# Patient Record
Sex: FEMALE | Race: WHITE | NOT HISPANIC OR LATINO
[De-identification: names, ages, dates, MRNs, and addresses within clinical notes are randomized per-mention and may not be internally consistent; named-entity substitution may affect disease eponyms.]

---

## 2017-01-10 ENCOUNTER — RECORD ABSTRACTING (OUTPATIENT)
Age: 57
End: 2017-01-10

## 2017-01-10 DIAGNOSIS — H00.19 CHALAZION UNSPECIFIED EYE, UNSPECIFIED EYELID: ICD-10-CM

## 2017-01-10 DIAGNOSIS — F17.200 NICOTINE DEPENDENCE, UNSPECIFIED, UNCOMPLICATED: ICD-10-CM

## 2017-01-10 DIAGNOSIS — H52.4 PRESBYOPIA: ICD-10-CM

## 2017-06-02 ENCOUNTER — OUTPATIENT (OUTPATIENT)
Dept: OUTPATIENT SERVICES | Facility: HOSPITAL | Age: 57
LOS: 1 days | Discharge: HOME | End: 2017-06-02

## 2017-06-28 DIAGNOSIS — H57.13 OCULAR PAIN, BILATERAL: ICD-10-CM

## 2017-06-28 DIAGNOSIS — M25.569 PAIN IN UNSPECIFIED KNEE: ICD-10-CM

## 2017-06-28 DIAGNOSIS — M25.9 JOINT DISORDER, UNSPECIFIED: ICD-10-CM

## 2017-07-06 ENCOUNTER — OUTPATIENT (OUTPATIENT)
Dept: OUTPATIENT SERVICES | Facility: HOSPITAL | Age: 57
LOS: 1 days | Discharge: HOME | End: 2017-07-06

## 2017-07-06 DIAGNOSIS — R52 PAIN, UNSPECIFIED: ICD-10-CM

## 2017-09-13 ENCOUNTER — OUTPATIENT (OUTPATIENT)
Dept: OUTPATIENT SERVICES | Facility: HOSPITAL | Age: 57
LOS: 1 days | Discharge: HOME | End: 2017-09-13

## 2018-03-01 ENCOUNTER — OUTPATIENT (OUTPATIENT)
Dept: OUTPATIENT SERVICES | Facility: HOSPITAL | Age: 58
LOS: 1 days | End: 2018-03-01
Payer: MEDICAID

## 2018-03-01 PROCEDURE — G9001: CPT

## 2018-03-12 ENCOUNTER — OUTPATIENT (OUTPATIENT)
Dept: OUTPATIENT SERVICES | Facility: HOSPITAL | Age: 58
LOS: 1 days | Discharge: HOME | End: 2018-03-12

## 2018-03-12 DIAGNOSIS — Z12.31 ENCOUNTER FOR SCREENING MAMMOGRAM FOR MALIGNANT NEOPLASM OF BREAST: ICD-10-CM

## 2018-03-13 ENCOUNTER — OUTPATIENT (OUTPATIENT)
Dept: OUTPATIENT SERVICES | Facility: HOSPITAL | Age: 58
LOS: 1 days | Discharge: HOME | End: 2018-03-13

## 2018-03-13 DIAGNOSIS — M06.9 RHEUMATOID ARTHRITIS, UNSPECIFIED: ICD-10-CM

## 2018-03-13 DIAGNOSIS — Z13.820 ENCOUNTER FOR SCREENING FOR OSTEOPOROSIS: ICD-10-CM

## 2018-03-13 DIAGNOSIS — Z78.0 ASYMPTOMATIC MENOPAUSAL STATE: ICD-10-CM

## 2018-03-13 DIAGNOSIS — R91.8 OTHER NONSPECIFIC ABNORMAL FINDING OF LUNG FIELD: ICD-10-CM

## 2018-03-13 DIAGNOSIS — R69 ILLNESS, UNSPECIFIED: ICD-10-CM

## 2018-03-13 DIAGNOSIS — F17.200 NICOTINE DEPENDENCE, UNSPECIFIED, UNCOMPLICATED: ICD-10-CM

## 2018-03-13 DIAGNOSIS — M89.9 DISORDER OF BONE, UNSPECIFIED: ICD-10-CM

## 2018-05-10 ENCOUNTER — OUTPATIENT (OUTPATIENT)
Dept: OUTPATIENT SERVICES | Facility: HOSPITAL | Age: 58
LOS: 1 days | Discharge: HOME | End: 2018-05-10

## 2018-05-10 DIAGNOSIS — Z00.00 ENCOUNTER FOR GENERAL ADULT MEDICAL EXAMINATION WITHOUT ABNORMAL FINDINGS: ICD-10-CM

## 2018-05-10 DIAGNOSIS — M47.812 SPONDYLOSIS WITHOUT MYELOPATHY OR RADICULOPATHY, CERVICAL REGION: ICD-10-CM

## 2018-05-10 DIAGNOSIS — E28.2 POLYCYSTIC OVARIAN SYNDROME: ICD-10-CM

## 2018-05-10 DIAGNOSIS — R53.82 CHRONIC FATIGUE, UNSPECIFIED: ICD-10-CM

## 2018-05-10 DIAGNOSIS — N83.209 UNSPECIFIED OVARIAN CYST, UNSPECIFIED SIDE: ICD-10-CM

## 2019-05-24 ENCOUNTER — OUTPATIENT (OUTPATIENT)
Dept: OUTPATIENT SERVICES | Facility: HOSPITAL | Age: 59
LOS: 1 days | Discharge: HOME | End: 2019-05-24
Payer: MEDICAID

## 2019-05-24 DIAGNOSIS — R91.8 OTHER NONSPECIFIC ABNORMAL FINDING OF LUNG FIELD: ICD-10-CM

## 2019-05-24 DIAGNOSIS — M25.562 PAIN IN LEFT KNEE: ICD-10-CM

## 2019-05-24 PROCEDURE — 73562 X-RAY EXAM OF KNEE 3: CPT | Mod: 26,LT

## 2019-05-24 PROCEDURE — 71046 X-RAY EXAM CHEST 2 VIEWS: CPT | Mod: 26

## 2019-06-26 ENCOUNTER — EMERGENCY (EMERGENCY)
Facility: HOSPITAL | Age: 59
LOS: 0 days | Discharge: HOME | End: 2019-06-26
Attending: EMERGENCY MEDICINE | Admitting: EMERGENCY MEDICINE
Payer: MEDICAID

## 2019-06-26 VITALS
SYSTOLIC BLOOD PRESSURE: 143 MMHG | DIASTOLIC BLOOD PRESSURE: 84 MMHG | HEART RATE: 103 BPM | OXYGEN SATURATION: 97 % | RESPIRATION RATE: 20 BRPM | TEMPERATURE: 98 F
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
## 2019-06-26 DIAGNOSIS — S52.501A UNSPECIFIED FRACTURE OF THE LOWER END OF RIGHT RADIUS, INITIAL ENCOUNTER FOR CLOSED FRACTURE: ICD-10-CM
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
## 2019-06-26 DIAGNOSIS — F17.200 NICOTINE DEPENDENCE, UNSPECIFIED, UNCOMPLICATED: ICD-10-CM

## 2019-06-26 DIAGNOSIS — S69.90XA UNSPECIFIED INJURY OF UNSPECIFIED WRIST, HAND AND FINGER(S), INITIAL ENCOUNTER: ICD-10-CM
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
## 2019-06-26 PROCEDURE — 99284 EMERGENCY DEPT VISIT MOD MDM: CPT | Mod: 25

## 2019-06-26 PROCEDURE — 73090 X-RAY EXAM OF FOREARM: CPT | Mod: 26,RT

## 2019-06-26 PROCEDURE — 73110 X-RAY EXAM OF WRIST: CPT | Mod: 26,RT

## 2019-06-26 PROCEDURE — 25605 CLTX DST RDL FX/EPHYS SEP W/: CPT | Mod: 54

## 2019-06-26 PROCEDURE — 73110 X-RAY EXAM OF WRIST: CPT | Mod: 26,RT,77

## 2019-06-26 RX ORDER — OXYCODONE AND ACETAMINOPHEN 5; 325 MG/1; MG/1
1 TABLET ORAL ONCE
Refills: 0 | Status: DISCONTINUED | OUTPATIENT
Start: 2019-06-26 | End: 2019-06-26

## 2019-06-26 RX ADMIN — OXYCODONE AND ACETAMINOPHEN 1 TABLET(S): 5; 325 TABLET ORAL at 16:11

## 2019-06-26 RX ADMIN — OXYCODONE AND ACETAMINOPHEN 1 TABLET(S): 5; 325 TABLET ORAL at 14:58
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
## 2019-06-26 NOTE — ED PROVIDER NOTE - CLINICAL SUMMARY MEDICAL DECISION MAKING FREE TEXT BOX
pt seen for wrist fracture in ED, s/p reduction and splinting with repeat imaging demonstrating better alignment. pt advised to follow up closely with orthopedist and referral given; pt agreed to f/u. Strict return precautions advised and pt verbalized understanding.
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
## 2019-06-26 NOTE — ED PROVIDER NOTE - NS ED ROS FT
Review of Systems:  	•	CONSTITUTIONAL - no fever, no diaphoresis, no chills  	•	SKIN - no rash  	•	HEMATOLOGIC - no bleeding, no bruising  	•	EYES - no eye pain, no blurry vision  	•	ENT - no congestion  	•	RESPIRATORY - no shortness of breath, no cough  	•	CARDIAC - no chest pain, no palpitations  	•	GI - no abd pain, no nausea, no vomiting, no diarrhea, no constipation  	•	GENITO-URINARY - no dysuria; no hematuria, no increased urinary frequency  	•	MUSCULOSKELETAL - +wrist pain, + swelling, no redness  	•	NEUROLOGIC - no weakness, no headache, no paresthesias, no LOC  	•	PSYCH - no anxiety,  no depression  	All other ROS are negative except as documented in HPI.

## 2019-06-26 NOTE — ED ADULT NURSE NOTE - NSIMPLEMENTINTERV_GEN_ALL_ED
Implemented All Universal Safety Interventions:  [place] to call system. Call bell, personal items and telephone within reach. Instruct patient to call for assistance. Room bathroom lighting operational. Non-slip footwear when patient is off stretcher. Physically safe environment: no spills, clutter or unnecessary equipment. Stretcher in lowest position, wheels locked, appropriate side rails in place.
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
## 2019-06-26 NOTE — ED PROVIDER NOTE - CARE PROVIDER_API CALL
Neymar Estrada)  Orthopaedic Surgery  [address] [place] [address]  Phone: [phone]  Fax: [phone]  Follow Up Time: 1-3 Days

## 2019-06-26 NOTE — ED PROVIDER NOTE - NSFOLLOWUPINSTRUCTIONS_ED_ALL_ED_FT
Wrist Fracture in Adults    WHAT YOU NEED TO KNOW:    What is a wrist fracture? A wrist fracture is a break in one or more of the bones in your wrist. A wrist fracture may be caused by a fall, car accident, or sports injury. In older adults, a wrist fracture may be caused by weak bones. Adult Arm Bones         What are the signs and symptoms of a wrist fracture?     Pain, swelling, and bruising of your injured wrist      Wrist pain that is worse when you hold something or put pressure on your wrist      Weakness, numbness, or tingling in your injured hand or wrist      Trouble moving your wrist, hand, or fingers      A change in the shape of your wrist     How is a wrist fracture diagnosed? Your healthcare provider will examine you. You may need an x-ray, CT scan, or MRI. You may be given contrast liquid to help your wrist bones show up better in pictures. Tell the healthcare provider if you have ever had an allergic reaction to contrast liquid. Do not enter the MRI room with anything metal. Metal can cause serious injury. Tell the healthcare provider if you have any metal in or on your body.     How is a wrist fracture treated? Treatment will depend on which wrist bone was broken and the kind of fracture you have. You may need any of the following:     Medicine may be given to decrease pain and swelling. You may need antibiotic medicine or a tetanus shot if there is a break in your skin.       A cast, splint, or brace may be placed on your wrist to decrease movement. These devices will help hold the bones in place while they heal.       Traction may be needed if your bone broke into 2 pieces. Traction pulls on the bone pieces to pull them back into place. A pin may be put in your bone or cast and hooked to ropes and a pulley. Weight is hung on the rope to help pull on the bones so they will heal correctly.      A closed reduction is a procedure to put your bones into the correct position without surgery.       Surgery may be needed to put your bones back into the correct position. Wires, pins, plates or screws may be used to help hold the bones in place.     How can I manage my symptoms?     Rest as much as possible. Do not play contact sports until the healthcare provider says it is okay.       Apply ice on your wrist for 15 to 20 minutes every hour or as directed. Use an ice pack, or put crushed ice in a plastic bag. Cover it with a towel before you place it on your skin. Ice helps prevent tissue damage and decreases swelling and pain.      Elevate your wrist above the level of your heart as often as possible. This will help decrease swelling and pain. Prop your wrist on pillows or blankets to keep it elevated comfortably.       Go to physical therapy as directed. You may need physical therapy after your wrist heals and the cast is removed. A physical therapist can teach you exercises to help improve movement and strength and to decrease pain.     When should I seek immediate care?     Your pain gets worse or does not get better after you take pain medicine.      Your cast or splint breaks, gets wet, or is damaged.      Your hand or fingers feel numb or cold.       Your hand or fingers turn white or blue.       Your splint or cast feels too tight.       You have more pain or swelling after the cast or splint is put on.    When should I contact my healthcare provider?     You have a fever.       There is a foul smell or blood coming from under the cast.      You have questions or concerns about your condition or care.    CARE AGREEMENT:    You have the right to help plan your care. Learn about your health condition and how it may be treated. Discuss treatment options with your healthcare providers to decide what care you want to receive. You always have the right to refuse treatment.

## 2019-06-26 NOTE — ED PROVIDER NOTE - PHYSICAL EXAMINATION
VITAL SIGNS: I have reviewed nursing notes and confirm.  CONSTITUTIONAL: Well-developed; well-nourished; in no acute distress.  SKIN: Skin exam is warm and dry, no acute rash.  HEAD: Normocephalic; atraumatic.  EYES: PERRL, EOM intact; conjunctiva and sclera clear.  ENT: No nasal discharge; airway clear.   NECK: Supple; non tender.  CARD: S1, S2 normal; no murmurs, gallops, or rubs. Regular rate and rhythm.  RESP: Clear to auscultation bilaterally. No wheezes, rales or rhonchi.  ABD: Normal bowel sounds; soft; non-distended; non-tender.   EXT: +Limited and painful ROM of right wrist with swelling and deformity.   LYMPH: No acute cervical adenopathy.  NEURO: Alert, oriented. Grossly unremarkable. No focal deficits. Motor and sensation intact. Neurovascularly intact.   PSYCH: Cooperative, appropriate.

## 2019-06-26 NOTE — ED PROVIDER NOTE - NSFOLLOWUPCLINICS_GEN_ALL_ED_FT
[facility]  Orthpedic  [address], [place]   Phone: [phone]  Fax:   Follow Up Time: 1-3 Days

## 2019-06-28 NOTE — ED PROCEDURE NOTE - CPROC ED POST PROC CARE GUIDE1
Instructed patient/caregiver to follow-up with primary care physician./Instructed patient/caregiver regarding signs and symptoms of infection./Elevate the injured extremity as instructed./Verbal/written post procedure instructions were given to patient/caregiver./Keep the cast/splint/dressing clean and dry.
Elevate the injured extremity as instructed./Verbal/written post procedure instructions were given to patient/caregiver./Instructed patient/caregiver to follow-up with primary care physician./Keep the cast/splint/dressing clean and dry.

## 2019-07-10 ENCOUNTER — OUTPATIENT (OUTPATIENT)
Dept: OUTPATIENT SERVICES | Facility: HOSPITAL | Age: 59
LOS: 1 days | Discharge: HOME | End: 2019-07-10
Payer: MEDICAID

## 2019-07-10 VITALS
DIASTOLIC BLOOD PRESSURE: 62 MMHG | TEMPERATURE: 96 F | HEART RATE: 57 BPM | OXYGEN SATURATION: 96 % | RESPIRATION RATE: 18 BRPM | SYSTOLIC BLOOD PRESSURE: 122 MMHG | HEIGHT: 63 IN | WEIGHT: 116.84 LBS

## 2019-07-10 DIAGNOSIS — S52.551A OTHER EXTRAARTICULAR FRACTURE OF LOWER END OF RIGHT RADIUS, INITIAL ENCOUNTER FOR CLOSED FRACTURE: ICD-10-CM

## 2019-07-10 DIAGNOSIS — Z01.818 ENCOUNTER FOR OTHER PREPROCEDURAL EXAMINATION: ICD-10-CM

## 2019-07-10 DIAGNOSIS — Z98.890 OTHER SPECIFIED POSTPROCEDURAL STATES: Chronic | ICD-10-CM

## 2019-07-10 LAB
ALBUMIN SERPL ELPH-MCNC: 4.2 G/DL — SIGNIFICANT CHANGE UP (ref 3.5–5.2)
ALP SERPL-CCNC: 71 U/L — SIGNIFICANT CHANGE UP (ref 30–115)
ALT FLD-CCNC: 30 U/L — SIGNIFICANT CHANGE UP (ref 0–41)
ANION GAP SERPL CALC-SCNC: 12 MMOL/L — SIGNIFICANT CHANGE UP (ref 7–14)
APPEARANCE UR: CLEAR — SIGNIFICANT CHANGE UP
APTT BLD: 31.7 SEC — SIGNIFICANT CHANGE UP (ref 27–39.2)
AST SERPL-CCNC: 39 U/L — SIGNIFICANT CHANGE UP (ref 0–41)
BACTERIA # UR AUTO: NEGATIVE — SIGNIFICANT CHANGE UP
BASOPHILS # BLD AUTO: 0.07 K/UL — SIGNIFICANT CHANGE UP (ref 0–0.2)
BASOPHILS NFR BLD AUTO: 1 % — SIGNIFICANT CHANGE UP (ref 0–1)
BILIRUB SERPL-MCNC: <0.2 MG/DL — SIGNIFICANT CHANGE UP (ref 0.2–1.2)
BILIRUB UR-MCNC: NEGATIVE — SIGNIFICANT CHANGE UP
BUN SERPL-MCNC: 9 MG/DL — LOW (ref 10–20)
CALCIUM SERPL-MCNC: 9.1 MG/DL — SIGNIFICANT CHANGE UP (ref 8.5–10.1)
CHLORIDE SERPL-SCNC: 98 MMOL/L — SIGNIFICANT CHANGE UP (ref 98–110)
CO2 SERPL-SCNC: 29 MMOL/L — SIGNIFICANT CHANGE UP (ref 17–32)
COLOR SPEC: SIGNIFICANT CHANGE UP
CREAT SERPL-MCNC: 0.7 MG/DL — SIGNIFICANT CHANGE UP (ref 0.7–1.5)
DIFF PNL FLD: NEGATIVE — SIGNIFICANT CHANGE UP
EOSINOPHIL # BLD AUTO: 0.36 K/UL — SIGNIFICANT CHANGE UP (ref 0–0.7)
EOSINOPHIL NFR BLD AUTO: 5.3 % — SIGNIFICANT CHANGE UP (ref 0–8)
EPI CELLS # UR: 9 /HPF — HIGH (ref 0–5)
GLUCOSE SERPL-MCNC: 104 MG/DL — HIGH (ref 70–99)
GLUCOSE UR QL: NEGATIVE — SIGNIFICANT CHANGE UP
HCT VFR BLD CALC: 35.5 % — LOW (ref 37–47)
HGB BLD-MCNC: 11.8 G/DL — LOW (ref 12–16)
HYALINE CASTS # UR AUTO: 4 /LPF — SIGNIFICANT CHANGE UP (ref 0–7)
IMM GRANULOCYTES NFR BLD AUTO: 0.4 % — HIGH (ref 0.1–0.3)
INR BLD: 0.98 RATIO — SIGNIFICANT CHANGE UP (ref 0.65–1.3)
KETONES UR-MCNC: NEGATIVE — SIGNIFICANT CHANGE UP
LEUKOCYTE ESTERASE UR-ACNC: ABNORMAL
LYMPHOCYTES # BLD AUTO: 2.38 K/UL — SIGNIFICANT CHANGE UP (ref 1.2–3.4)
LYMPHOCYTES # BLD AUTO: 34.7 % — SIGNIFICANT CHANGE UP (ref 20.5–51.1)
MCHC RBC-ENTMCNC: 32.3 PG — HIGH (ref 27–31)
MCHC RBC-ENTMCNC: 33.2 G/DL — SIGNIFICANT CHANGE UP (ref 32–37)
MCV RBC AUTO: 97.3 FL — SIGNIFICANT CHANGE UP (ref 81–99)
MONOCYTES # BLD AUTO: 0.73 K/UL — HIGH (ref 0.1–0.6)
MONOCYTES NFR BLD AUTO: 10.7 % — HIGH (ref 1.7–9.3)
NEUTROPHILS # BLD AUTO: 3.28 K/UL — SIGNIFICANT CHANGE UP (ref 1.4–6.5)
NEUTROPHILS NFR BLD AUTO: 47.9 % — SIGNIFICANT CHANGE UP (ref 42.2–75.2)
NITRITE UR-MCNC: NEGATIVE — SIGNIFICANT CHANGE UP
NRBC # BLD: 0 /100 WBCS — SIGNIFICANT CHANGE UP (ref 0–0)
PH UR: 6 — SIGNIFICANT CHANGE UP (ref 5–8)
PLATELET # BLD AUTO: 338 K/UL — SIGNIFICANT CHANGE UP (ref 130–400)
POTASSIUM SERPL-MCNC: 4.1 MMOL/L — SIGNIFICANT CHANGE UP (ref 3.5–5)
POTASSIUM SERPL-SCNC: 4.1 MMOL/L — SIGNIFICANT CHANGE UP (ref 3.5–5)
PROT SERPL-MCNC: 6.5 G/DL — SIGNIFICANT CHANGE UP (ref 6–8)
PROT UR-MCNC: NEGATIVE — SIGNIFICANT CHANGE UP
PROTHROM AB SERPL-ACNC: 11.3 SEC — SIGNIFICANT CHANGE UP (ref 9.95–12.87)
RBC # BLD: 3.65 M/UL — LOW (ref 4.2–5.4)
RBC # FLD: 14 % — SIGNIFICANT CHANGE UP (ref 11.5–14.5)
RBC CASTS # UR COMP ASSIST: 1 /HPF — SIGNIFICANT CHANGE UP (ref 0–4)
SODIUM SERPL-SCNC: 139 MMOL/L — SIGNIFICANT CHANGE UP (ref 135–146)
SP GR SPEC: 1.01 — SIGNIFICANT CHANGE UP (ref 1.01–1.02)
UROBILINOGEN FLD QL: SIGNIFICANT CHANGE UP
WBC # BLD: 6.85 K/UL — SIGNIFICANT CHANGE UP (ref 4.8–10.8)
WBC # FLD AUTO: 6.85 K/UL — SIGNIFICANT CHANGE UP (ref 4.8–10.8)
WBC UR QL: 13 /HPF — HIGH (ref 0–5)

## 2019-07-10 PROCEDURE — 93010 ELECTROCARDIOGRAM REPORT: CPT

## 2019-07-10 NOTE — H&P PST ADULT - NSICDXPASTMEDICALHX_GEN_ALL_CORE_FT
PAST MEDICAL HISTORY:  COPD (chronic obstructive pulmonary disease)     Fibromyalgia     H/O aneurysm brain    Hypercholesteremia     SOB (shortness of breath)     Spinal stenosis

## 2019-07-10 NOTE — H&P PST ADULT - NSANTHOSAYNRD_GEN_A_CORE
No. VIVEK screening performed.  STOP BANG Legend: 0-2 = LOW Risk; 3-4 = INTERMEDIATE Risk; 5-8 = HIGH Risk
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
## 2019-07-10 NOTE — H&P PST ADULT - NSICDXPASTSURGICALHX_GEN_ALL_CORE_FT
PAST SURGICAL HISTORY:  S/P cerebral aneurysm repair coil PAST SURGICAL HISTORY:  S/P cerebral aneurysm repair coil x 20 yrs ago

## 2019-07-10 NOTE — H&P PST ADULT - NSALCOHOLFREQ_GEN_A_CORE_SD
Intubation    Diagnosis: Delirium tremens  Patient location during procedure: ICU  Procedure start time: 3/21/2018 9:41 AM  Timeout: 3/21/2018 9:40 AM  Procedure end time: 3/21/2018 9:45 AM  Staffing  Anesthesiologist: KATHARINE AGUILAR  Resident/CRNA: TARAH MOSLEY  Performed: resident/CRNA   Anesthesiologist was present at the time of the procedure.  Preanesthetic Checklist  Completed: patient identified, site marked, surgical consent, pre-op evaluation, timeout performed, IV checked, risks and benefits discussed, monitors and equipment checked and anesthesia consent given  Intubation  Indication: airway protection  Pre-oxygenation. Induction: intravenous (12mg etomidate and 120mg succinylcholine given for induction, followed by 8mg etomidate for further sedation), mask ventilation: easy mask.  Intubation: postinduction (6mg etomidate and 120mg succinylcholine given for induction, followed by 4mg etomidate for further sedation), laryngoscopy glidescope, Daniela 3.  Endotracheal Tube: oral, 8.0 mm ID, cuffed (inflated to minimal occlusive pressure)  Attempts: 1, Grade I - full view of cords  Complicating Factors: none  Tube secured at 23 cm at the lips.  Findings post-intubation: bilateral breath sounds, positive ETCO2, atraumatic / condition of teeth unchanged  Position Confirmation: auscultation  Additional Notes  Initial direct laryngoscopic view obstructed by dried blood and copious secretions, 2nd attempt with glidescope with grade 1 view                   monthly or less

## 2019-07-10 NOTE — H&P PST ADULT - REASON FOR ADMISSION
57 yo f presents to PAST for ORIF right Distal radius under regional anesthesia at [facility] OR by Dr. Silvestre on 07/12/2019.

## 2019-11-23 ENCOUNTER — INPATIENT (INPATIENT)
Facility: HOSPITAL | Age: 59
LOS: 2 days | Discharge: HOME | End: 2019-11-26
Attending: INTERNAL MEDICINE | Admitting: INTERNAL MEDICINE
Payer: MEDICAID

## 2019-11-23 VITALS
HEART RATE: 90 BPM | RESPIRATION RATE: 18 BRPM | DIASTOLIC BLOOD PRESSURE: 53 MMHG | SYSTOLIC BLOOD PRESSURE: 118 MMHG | TEMPERATURE: 97 F | OXYGEN SATURATION: 99 %

## 2019-11-23 DIAGNOSIS — Z98.890 OTHER SPECIFIED POSTPROCEDURAL STATES: Chronic | ICD-10-CM

## 2019-11-23 PROBLEM — Z86.79 PERSONAL HISTORY OF OTHER DISEASES OF THE CIRCULATORY SYSTEM: Chronic | Status: ACTIVE | Noted: 2019-07-10

## 2019-11-23 PROBLEM — J44.9 CHRONIC OBSTRUCTIVE PULMONARY DISEASE, UNSPECIFIED: Chronic | Status: ACTIVE | Noted: 2019-07-10

## 2019-11-23 PROBLEM — R06.02 SHORTNESS OF BREATH: Chronic | Status: ACTIVE | Noted: 2019-07-10

## 2019-11-23 PROBLEM — E78.00 PURE HYPERCHOLESTEROLEMIA, UNSPECIFIED: Chronic | Status: ACTIVE | Noted: 2019-07-10

## 2019-11-23 PROBLEM — M48.00 SPINAL STENOSIS, SITE UNSPECIFIED: Chronic | Status: ACTIVE | Noted: 2019-07-10

## 2019-11-23 PROBLEM — M79.7 FIBROMYALGIA: Chronic | Status: ACTIVE | Noted: 2019-07-10

## 2019-11-23 LAB
ALBUMIN SERPL ELPH-MCNC: 4.5 G/DL — SIGNIFICANT CHANGE UP (ref 3.5–5.2)
ALP SERPL-CCNC: 65 U/L — SIGNIFICANT CHANGE UP (ref 30–115)
ALT FLD-CCNC: 25 U/L — SIGNIFICANT CHANGE UP (ref 0–41)
ANION GAP SERPL CALC-SCNC: 16 MMOL/L — HIGH (ref 7–14)
APPEARANCE UR: CLEAR — SIGNIFICANT CHANGE UP
AST SERPL-CCNC: 35 U/L — SIGNIFICANT CHANGE UP (ref 0–41)
BACTERIA # UR AUTO: NEGATIVE — SIGNIFICANT CHANGE UP
BASOPHILS # BLD AUTO: 0.09 K/UL — SIGNIFICANT CHANGE UP (ref 0–0.2)
BASOPHILS NFR BLD AUTO: 1.2 % — HIGH (ref 0–1)
BILIRUB SERPL-MCNC: <0.2 MG/DL — SIGNIFICANT CHANGE UP (ref 0.2–1.2)
BILIRUB UR-MCNC: NEGATIVE — SIGNIFICANT CHANGE UP
BUN SERPL-MCNC: 7 MG/DL — LOW (ref 10–20)
CALCIUM SERPL-MCNC: 9.1 MG/DL — SIGNIFICANT CHANGE UP (ref 8.5–10.1)
CHLORIDE SERPL-SCNC: 91 MMOL/L — LOW (ref 98–110)
CO2 SERPL-SCNC: 20 MMOL/L — SIGNIFICANT CHANGE UP (ref 17–32)
COLOR SPEC: COLORLESS — SIGNIFICANT CHANGE UP
CREAT SERPL-MCNC: 0.5 MG/DL — LOW (ref 0.7–1.5)
DIFF PNL FLD: NEGATIVE — SIGNIFICANT CHANGE UP
EOSINOPHIL # BLD AUTO: 0.28 K/UL — SIGNIFICANT CHANGE UP (ref 0–0.7)
EOSINOPHIL NFR BLD AUTO: 3.9 % — SIGNIFICANT CHANGE UP (ref 0–8)
EPI CELLS # UR: 8 /HPF — HIGH (ref 0–5)
GLUCOSE SERPL-MCNC: 93 MG/DL — SIGNIFICANT CHANGE UP (ref 70–99)
GLUCOSE UR QL: NEGATIVE — SIGNIFICANT CHANGE UP
HCT VFR BLD CALC: 37.1 % — SIGNIFICANT CHANGE UP (ref 37–47)
HGB BLD-MCNC: 12.9 G/DL — SIGNIFICANT CHANGE UP (ref 12–16)
HYALINE CASTS # UR AUTO: 0 /LPF — SIGNIFICANT CHANGE UP (ref 0–7)
IMM GRANULOCYTES NFR BLD AUTO: 0.3 % — SIGNIFICANT CHANGE UP (ref 0.1–0.3)
KETONES UR-MCNC: NEGATIVE — SIGNIFICANT CHANGE UP
LACTATE SERPL-SCNC: 1.4 MMOL/L — SIGNIFICANT CHANGE UP (ref 0.5–2.2)
LEUKOCYTE ESTERASE UR-ACNC: ABNORMAL
LIDOCAIN IGE QN: 66 U/L — HIGH (ref 7–60)
LYMPHOCYTES # BLD AUTO: 3.11 K/UL — SIGNIFICANT CHANGE UP (ref 1.2–3.4)
LYMPHOCYTES # BLD AUTO: 42.8 % — SIGNIFICANT CHANGE UP (ref 20.5–51.1)
MAGNESIUM SERPL-MCNC: 1.8 MG/DL — SIGNIFICANT CHANGE UP (ref 1.8–2.4)
MCHC RBC-ENTMCNC: 32.3 PG — HIGH (ref 27–31)
MCHC RBC-ENTMCNC: 34.8 G/DL — SIGNIFICANT CHANGE UP (ref 32–37)
MCV RBC AUTO: 93 FL — SIGNIFICANT CHANGE UP (ref 81–99)
MONOCYTES # BLD AUTO: 0.58 K/UL — SIGNIFICANT CHANGE UP (ref 0.1–0.6)
MONOCYTES NFR BLD AUTO: 8 % — SIGNIFICANT CHANGE UP (ref 1.7–9.3)
NEUTROPHILS # BLD AUTO: 3.19 K/UL — SIGNIFICANT CHANGE UP (ref 1.4–6.5)
NEUTROPHILS NFR BLD AUTO: 43.8 % — SIGNIFICANT CHANGE UP (ref 42.2–75.2)
NITRITE UR-MCNC: NEGATIVE — SIGNIFICANT CHANGE UP
NRBC # BLD: 0 /100 WBCS — SIGNIFICANT CHANGE UP (ref 0–0)
NT-PROBNP SERPL-SCNC: 99 PG/ML — SIGNIFICANT CHANGE UP (ref 0–300)
PH UR: 6 — SIGNIFICANT CHANGE UP (ref 5–8)
PHOSPHATE SERPL-MCNC: 3.5 MG/DL — SIGNIFICANT CHANGE UP (ref 2.1–4.9)
PLATELET # BLD AUTO: 318 K/UL — SIGNIFICANT CHANGE UP (ref 130–400)
POTASSIUM SERPL-MCNC: 4.4 MMOL/L — SIGNIFICANT CHANGE UP (ref 3.5–5)
POTASSIUM SERPL-SCNC: 4.4 MMOL/L — SIGNIFICANT CHANGE UP (ref 3.5–5)
PROT SERPL-MCNC: 6.9 G/DL — SIGNIFICANT CHANGE UP (ref 6–8)
PROT UR-MCNC: NEGATIVE — SIGNIFICANT CHANGE UP
RBC # BLD: 3.99 M/UL — LOW (ref 4.2–5.4)
RBC # FLD: 13 % — SIGNIFICANT CHANGE UP (ref 11.5–14.5)
RBC CASTS # UR COMP ASSIST: 1 /HPF — SIGNIFICANT CHANGE UP (ref 0–4)
SODIUM SERPL-SCNC: 127 MMOL/L — LOW (ref 135–146)
SP GR SPEC: 1.01 — LOW (ref 1.01–1.02)
TROPONIN T SERPL-MCNC: <0.01 NG/ML — SIGNIFICANT CHANGE UP
UROBILINOGEN FLD QL: SIGNIFICANT CHANGE UP
WBC # BLD: 7.27 K/UL — SIGNIFICANT CHANGE UP (ref 4.8–10.8)
WBC # FLD AUTO: 7.27 K/UL — SIGNIFICANT CHANGE UP (ref 4.8–10.8)
WBC UR QL: 16 /HPF — HIGH (ref 0–5)

## 2019-11-23 PROCEDURE — 71046 X-RAY EXAM CHEST 2 VIEWS: CPT | Mod: 26

## 2019-11-23 PROCEDURE — 74177 CT ABD & PELVIS W/CONTRAST: CPT | Mod: 26

## 2019-11-23 PROCEDURE — 99285 EMERGENCY DEPT VISIT HI MDM: CPT

## 2019-11-23 RX ORDER — IPRATROPIUM/ALBUTEROL SULFATE 18-103MCG
3 AEROSOL WITH ADAPTER (GRAM) INHALATION ONCE
Refills: 0 | Status: COMPLETED | OUTPATIENT
Start: 2019-11-23 | End: 2019-11-23

## 2019-11-23 RX ORDER — ONDANSETRON 8 MG/1
4 TABLET, FILM COATED ORAL ONCE
Refills: 0 | Status: COMPLETED | OUTPATIENT
Start: 2019-11-23 | End: 2019-11-23

## 2019-11-23 RX ORDER — METOCLOPRAMIDE HCL 10 MG
10 TABLET ORAL ONCE
Refills: 0 | Status: COMPLETED | OUTPATIENT
Start: 2019-11-23 | End: 2019-11-23

## 2019-11-23 RX ORDER — SODIUM CHLORIDE 9 MG/ML
1000 INJECTION, SOLUTION INTRAVENOUS ONCE
Refills: 0 | Status: COMPLETED | OUTPATIENT
Start: 2019-11-23 | End: 2019-11-23

## 2019-11-23 RX ORDER — MORPHINE SULFATE 50 MG/1
4 CAPSULE, EXTENDED RELEASE ORAL ONCE
Refills: 0 | Status: DISCONTINUED | OUTPATIENT
Start: 2019-11-23 | End: 2019-11-23

## 2019-11-23 RX ADMIN — Medication 3 MILLILITER(S): at 16:45

## 2019-11-23 RX ADMIN — Medication 3 MILLILITER(S): at 17:06

## 2019-11-23 RX ADMIN — Medication 125 MILLIGRAM(S): at 17:06

## 2019-11-23 RX ADMIN — Medication 10 MILLIGRAM(S): at 17:07

## 2019-11-23 RX ADMIN — ONDANSETRON 4 MILLIGRAM(S): 8 TABLET, FILM COATED ORAL at 19:23

## 2019-11-23 RX ADMIN — ONDANSETRON 4 MILLIGRAM(S): 8 TABLET, FILM COATED ORAL at 22:19

## 2019-11-23 RX ADMIN — SODIUM CHLORIDE 2000 MILLILITER(S): 9 INJECTION, SOLUTION INTRAVENOUS at 17:07

## 2019-11-23 NOTE — ED ADULT NURSE REASSESSMENT NOTE - NS ED NURSE REASSESS COMMENT FT1
advised pt she cant not eat or drink, states " im going to go to the bathroom to drink out of the sink " advised against this and told pt that she needs to wait for CT results

## 2019-11-23 NOTE — ED PROVIDER NOTE - CARE PLAN
Principal Discharge DX:	Hyponatremia  Secondary Diagnosis:	Lightheadedness  Secondary Diagnosis:	Dyspnea, unspecified type  Secondary Diagnosis:	Epigastric pain  Secondary Diagnosis:	Intractable nausea and vomiting  Secondary Diagnosis:	SMA stenosis

## 2019-11-23 NOTE — ED PROVIDER NOTE - NS ED ROS FT
Review of Systems:  •	CONSTITUTIONAL - No fever, No diaphoresis, No weight change  •	SKIN - No rash  •	HEMATOLOGIC - No abnormal bleeding or bruising  •	EYES - No eye pain, No blurred vision  •	ENT - No change in hearing, No sore throat, No neck pain, No rhinorrhea, No ear pain  •	RESPIRATORY - No shortness of breath, No cough  •	CARDIAC -No chest pain, No palpitations  •	GI - + abdominal pain, + nausea, + vomiting, + diarrhea, No constipation, No bright red blood per rectum or melena. No flank pain  •             - No dysuria, frequency, hematuria.   •	ENDO - No polydypsia, No polyuria, No heat/cold intolerance  •	MUSCULOSKELETAL - No joint paint, No swelling, No back pain  •	NEUROLOGIC - No numbness, No focal weakness, No headache, No dizziness  All other systems negative, unless specified in HPI

## 2019-11-23 NOTE — CONSULT NOTE ADULT - ATTENDING COMMENTS
59 year old with COPD and NV  pt has chronic SMA stenosis on CTA  no evidence of acute mesenteric ischemia  suggest outpt follow up

## 2019-11-23 NOTE — CONSULT NOTE ADULT - SUBJECTIVE AND OBJECTIVE BOX
HARI VÁSQUEZ [id number]  59y Female    HPI: 60 y/o female with PMH of HTN, HLD, COPD, fibromyalgia who presents to ED with multiple medical complaints. PT states for the past 4 days she has had multiple episodes of NBNB vomiting and NB diarrhea with diffuse abdominal pain. Pt also c/o 1 week of increased wheeze c/w COPD exacerbation. Pt has not been using her home medications for COPD.    Vascular was consulted for 1 month of abdominal pain, nausea and vomiting with a incidental finding of SMA stenosis on CT scan      PAST MEDICAL & SURGICAL HISTORY:  H/O aneurysm: brain  SOB (shortness of breath)  Fibromyalgia  Spinal stenosis  Hypercholesteremia  COPD (chronic obstructive pulmonary disease)  S/P cerebral aneurysm repair: coil x 20 yrs ago    Allergies  No Known Allergies    Intolerances        REVIEW OF SYSTEMS    [x ] A ten-point review of systems was otherwise negative except as noted.  [ ] Due to altered mental status/intubation, subjective information were not able to be obtained from the patient. History was obtained, to the extent possible, from review of the chart and collateral sources of information.      Vital Signs Last 24 Hrs  T(C): 35.9 (2019 15:56), Max: 35.9 (2019 15:56)  T(F): 96.7 (2019 15:56), Max: 96.7 (2019 15:56)  HR: 90 (2019 15:56) (90 - 90)  BP: 118/53 (2019 15:56) (118/53 - 118/53)  BP(mean): --  RR: 18 (2019 15:56) (18 - 18)  SpO2: 99% (2019 15:56) (99% - 99%)    PHYSICAL EXAM:  GENERAL: NAD, well-appearing, SOB   CHEST/LUNG: Clear to auscultation bilaterally  HEART: Regular rate and rhythm  ABDOMEN: Soft, Nontender, Nondistended; mild TTP   EXTREMITIES:  No clubbing, cyanosis, or edema      LABS:  Labs:  CAPILLARY BLOOD GLUCOSE                          12.9   7.27  )-----------( 318      ( 2019 17:00 )             37.1       Auto Immature Granulocyte %: 0.3 % (19 @ 17:00)  Auto Neutrophil %: 43.8 % (19 @ 17:00)        127<L>  |  91<L>  |  7<L>  ----------------------------<  93  4.4   |  20  |  0.5<L>      Calcium, Total Serum: 9.1 mg/dL (19 @ 17:00)      LFTs:             6.9  | <0.2 | 35       ------------------[65      ( 2019 17:00 )  4.5  | x    | 25          Lipase:66     Amylase:x         Lactate, Blood: 1.4 mmol/L (19 @ 17:00)      Coags:    CARDIAC MARKERS ( 2019 17:00 )  x     / <0.01 ng/mL / x     / x     / x          Serum Pro-Brain Natriuretic Peptide: 99 pg/mL (19 @ 17:00)      Urinalysis Basic - ( 2019 16:43 )    Color: Colorless / Appearance: Clear / S.006 / pH: x  Gluc: x / Ketone: Negative  / Bili: Negative / Urobili: <2 mg/dL   Blood: x / Protein: Negative / Nitrite: Negative   Leuk Esterase: Moderate / RBC: 1 /HPF / WBC 16 /HPF   Sq Epi: x / Non Sq Epi: 8 /HPF / Bacteria: Negative      RADIOLOGY & ADDITIONAL STUDIES:  < from: CT Abdomen and Pelvis w/ IV Cont (19 @ 22:13) >  MPRESSION:    Findings are suspicious for gastritis involving the antrum. Nonemergent   upper endoscopy may be of benefit.    Severe diffuse atherosclerosis with long segment of severe narrowing   involving proximal SMA.      < end of copied text >

## 2019-11-23 NOTE — ED PROVIDER NOTE - OBJECTIVE STATEMENT
58 y/o female with PMH of HTN, HLD, COPD, fibromyalgia who presents to ED with multiple medical complaints. PT states for the past 4 days she has had multiple episodes of NBNB vomiting and NB diarrhea with diffuse abdominal pain. Pt also c/o 1 week of increased wheeze c/w COPD exacerbation. Pt has not been using her home medications for COPD.

## 2019-11-23 NOTE — ED PROVIDER NOTE - ATTENDING CONTRIBUTION TO CARE
58 yo female h/o COPD, active smoker, spinal stenosis, fibromyalgia, elevated cholesterol c/o SOB for 1 day similar to prior COPD exacerbations and rt sided abdominal pain radiating to back for about a month associated with nausea , worse after eating.  No fever or chills, no black or bloody stools, no hematuria or frequency,  Middle aged female appears older than stated age, resting on a stretcher, NAD, requesting to get something to eat, PERRL, pink conjunctivae, no acute respiratory distress, equal air entry, RRR, distal pulses intact, abdomen soft, RUQ ttp, no rebound or guarding, skin nml color and temperature.  Will give neb, check labs. get CT scan to evaluate the abdomen, reassess,  Patient is amenable with the plan.

## 2019-11-23 NOTE — ED PROVIDER NOTE - SECONDARY DIAGNOSIS.
Lightheadedness Dyspnea, unspecified type Epigastric pain SMA stenosis Intractable nausea and vomiting

## 2019-11-23 NOTE — ED PROVIDER NOTE - PHYSICAL EXAMINATION
CONSTITUTIONAL: Well-developed; well-nourished; in no acute distress.   SKIN: warm, dry  HEAD: Normocephalic; atraumatic.  EYES: no conjunctival injection. PERRL.   ENT: No nasal discharge; airway clear.  NECK: Supple; non tender.  CARD: S1, S2 normal; no murmurs, gallops, or rubs. Regular rate and rhythm.   RESP: No wheezes, rales or rhonchi.  ABD: soft moderate ttp in the RLQ, mild diffuse ttp.   EXT: Normal ROM.  No clubbing, cyanosis or edema.   LYMPH: No acute cervical adenopathy.  NEURO: Alert, oriented, grossly unremarkable  PSYCH: Cooperative, appropriate.

## 2019-11-23 NOTE — ED PROVIDER NOTE - PROGRESS NOTE DETAILS
The paitent appears well, nml work of breathing, wants something to eat.  Case endorsed to Dr Salcido to follow up CT scan, reassess and dispo. CT results noted, discussed with vascular surgery, consult requested. ADRY Garner: patient signed out by Dr. Orlando, pending CT

## 2019-11-23 NOTE — ED PROVIDER NOTE - CLINICAL SUMMARY MEDICAL DECISION MAKING FREE TEXT BOX
patient remained stable in ED, improved well, labs/EKG/imaging findings noted, and discussed with patient and discussed with MAR, patient is admitted to medicine in stable condition.

## 2019-11-24 LAB
ALBUMIN SERPL ELPH-MCNC: 4.2 G/DL — SIGNIFICANT CHANGE UP (ref 3.5–5.2)
ALP SERPL-CCNC: 59 U/L — SIGNIFICANT CHANGE UP (ref 30–115)
ALT FLD-CCNC: 19 U/L — SIGNIFICANT CHANGE UP (ref 0–41)
ANION GAP SERPL CALC-SCNC: 14 MMOL/L — SIGNIFICANT CHANGE UP (ref 7–14)
APTT BLD: 30.1 SEC — SIGNIFICANT CHANGE UP (ref 27–39.2)
AST SERPL-CCNC: 24 U/L — SIGNIFICANT CHANGE UP (ref 0–41)
BASOPHILS # BLD AUTO: 0.02 K/UL — SIGNIFICANT CHANGE UP (ref 0–0.2)
BASOPHILS NFR BLD AUTO: 0.2 % — SIGNIFICANT CHANGE UP (ref 0–1)
BILIRUB SERPL-MCNC: <0.2 MG/DL — SIGNIFICANT CHANGE UP (ref 0.2–1.2)
BUN SERPL-MCNC: 9 MG/DL — LOW (ref 10–20)
CALCIUM SERPL-MCNC: 8.9 MG/DL — SIGNIFICANT CHANGE UP (ref 8.5–10.1)
CHLORIDE SERPL-SCNC: 99 MMOL/L — SIGNIFICANT CHANGE UP (ref 98–110)
CHOLEST SERPL-MCNC: 245 MG/DL — HIGH (ref 100–200)
CO2 SERPL-SCNC: 23 MMOL/L — SIGNIFICANT CHANGE UP (ref 17–32)
CREAT SERPL-MCNC: 0.6 MG/DL — LOW (ref 0.7–1.5)
EOSINOPHIL # BLD AUTO: 0 K/UL — SIGNIFICANT CHANGE UP (ref 0–0.7)
EOSINOPHIL NFR BLD AUTO: 0 % — SIGNIFICANT CHANGE UP (ref 0–8)
GLUCOSE SERPL-MCNC: 114 MG/DL — HIGH (ref 70–99)
HCT VFR BLD CALC: 33.6 % — LOW (ref 37–47)
HDLC SERPL-MCNC: 95 MG/DL — SIGNIFICANT CHANGE UP
HGB BLD-MCNC: 11.4 G/DL — LOW (ref 12–16)
IMM GRANULOCYTES NFR BLD AUTO: 0.3 % — SIGNIFICANT CHANGE UP (ref 0.1–0.3)
INR BLD: 0.94 RATIO — SIGNIFICANT CHANGE UP (ref 0.65–1.3)
LIPID PNL WITH DIRECT LDL SERPL: 153 MG/DL — HIGH (ref 4–129)
LYMPHOCYTES # BLD AUTO: 1.07 K/UL — LOW (ref 1.2–3.4)
LYMPHOCYTES # BLD AUTO: 11.6 % — LOW (ref 20.5–51.1)
MAGNESIUM SERPL-MCNC: 1.6 MG/DL — LOW (ref 1.8–2.4)
MAGNESIUM SERPL-MCNC: 2.6 MG/DL — HIGH (ref 1.8–2.4)
MCHC RBC-ENTMCNC: 31.9 PG — HIGH (ref 27–31)
MCHC RBC-ENTMCNC: 33.9 G/DL — SIGNIFICANT CHANGE UP (ref 32–37)
MCV RBC AUTO: 94.1 FL — SIGNIFICANT CHANGE UP (ref 81–99)
MONOCYTES # BLD AUTO: 0.62 K/UL — HIGH (ref 0.1–0.6)
MONOCYTES NFR BLD AUTO: 6.7 % — SIGNIFICANT CHANGE UP (ref 1.7–9.3)
NEUTROPHILS # BLD AUTO: 7.48 K/UL — HIGH (ref 1.4–6.5)
NEUTROPHILS NFR BLD AUTO: 81.2 % — HIGH (ref 42.2–75.2)
NRBC # BLD: 0 /100 WBCS — SIGNIFICANT CHANGE UP (ref 0–0)
PLATELET # BLD AUTO: 292 K/UL — SIGNIFICANT CHANGE UP (ref 130–400)
POTASSIUM SERPL-MCNC: 5 MMOL/L — SIGNIFICANT CHANGE UP (ref 3.5–5)
POTASSIUM SERPL-SCNC: 5 MMOL/L — SIGNIFICANT CHANGE UP (ref 3.5–5)
PROT SERPL-MCNC: 6.2 G/DL — SIGNIFICANT CHANGE UP (ref 6–8)
PROTHROM AB SERPL-ACNC: 10.8 SEC — SIGNIFICANT CHANGE UP (ref 9.95–12.87)
RBC # BLD: 3.57 M/UL — LOW (ref 4.2–5.4)
RBC # FLD: 13.1 % — SIGNIFICANT CHANGE UP (ref 11.5–14.5)
SODIUM SERPL-SCNC: 136 MMOL/L — SIGNIFICANT CHANGE UP (ref 135–146)
TOTAL CHOLESTEROL/HDL RATIO MEASUREMENT: 2.6 RATIO — LOW (ref 4–5.5)
TRIGL SERPL-MCNC: 75 MG/DL — SIGNIFICANT CHANGE UP (ref 10–149)
WBC # BLD: 9.22 K/UL — SIGNIFICANT CHANGE UP (ref 4.8–10.8)
WBC # FLD AUTO: 9.22 K/UL — SIGNIFICANT CHANGE UP (ref 4.8–10.8)

## 2019-11-24 PROCEDURE — 93010 ELECTROCARDIOGRAM REPORT: CPT

## 2019-11-24 PROCEDURE — 99223 1ST HOSP IP/OBS HIGH 75: CPT

## 2019-11-24 PROCEDURE — 99222 1ST HOSP IP/OBS MODERATE 55: CPT

## 2019-11-24 RX ORDER — ALPRAZOLAM 0.25 MG
1 TABLET ORAL DAILY
Refills: 0 | Status: DISCONTINUED | OUTPATIENT
Start: 2019-11-24 | End: 2019-11-26

## 2019-11-24 RX ORDER — METOCLOPRAMIDE HCL 10 MG
10 TABLET ORAL ONCE
Refills: 0 | Status: COMPLETED | OUTPATIENT
Start: 2019-11-24 | End: 2019-11-24

## 2019-11-24 RX ORDER — OXYCODONE HYDROCHLORIDE 5 MG/1
30 TABLET ORAL EVERY 6 HOURS
Refills: 0 | Status: DISCONTINUED | OUTPATIENT
Start: 2019-11-24 | End: 2019-11-26

## 2019-11-24 RX ORDER — PANTOPRAZOLE SODIUM 20 MG/1
40 TABLET, DELAYED RELEASE ORAL
Qty: 0 | Refills: 0 | DISCHARGE
Start: 2019-11-24

## 2019-11-24 RX ORDER — OXYCODONE HYDROCHLORIDE 5 MG/1
30 TABLET ORAL EVERY 6 HOURS
Refills: 0 | Status: DISCONTINUED | OUTPATIENT
Start: 2019-11-24 | End: 2019-11-24

## 2019-11-24 RX ORDER — METOCLOPRAMIDE HCL 10 MG
10 TABLET ORAL EVERY 6 HOURS
Refills: 0 | Status: COMPLETED | OUTPATIENT
Start: 2019-11-24 | End: 2019-11-25

## 2019-11-24 RX ORDER — BUDESONIDE AND FORMOTEROL FUMARATE DIHYDRATE 160; 4.5 UG/1; UG/1
2 AEROSOL RESPIRATORY (INHALATION)
Refills: 0 | Status: DISCONTINUED | OUTPATIENT
Start: 2019-11-24 | End: 2019-11-26

## 2019-11-24 RX ORDER — PANTOPRAZOLE SODIUM 20 MG/1
40 TABLET, DELAYED RELEASE ORAL
Refills: 0 | Status: DISCONTINUED | OUTPATIENT
Start: 2019-11-24 | End: 2019-11-25

## 2019-11-24 RX ORDER — LANOLIN ALCOHOL/MO/W.PET/CERES
3 CREAM (GRAM) TOPICAL ONCE
Refills: 0 | Status: COMPLETED | OUTPATIENT
Start: 2019-11-24 | End: 2019-11-24

## 2019-11-24 RX ORDER — ACETAMINOPHEN 500 MG
650 TABLET ORAL EVERY 6 HOURS
Refills: 0 | Status: DISCONTINUED | OUTPATIENT
Start: 2019-11-24 | End: 2019-11-26

## 2019-11-24 RX ORDER — SODIUM CHLORIDE 9 MG/ML
1000 INJECTION INTRAMUSCULAR; INTRAVENOUS; SUBCUTANEOUS
Refills: 0 | Status: DISCONTINUED | OUTPATIENT
Start: 2019-11-24 | End: 2019-11-25

## 2019-11-24 RX ORDER — MAGNESIUM SULFATE 500 MG/ML
2 VIAL (ML) INJECTION ONCE
Refills: 0 | Status: COMPLETED | OUTPATIENT
Start: 2019-11-24 | End: 2019-11-24

## 2019-11-24 RX ORDER — ALPRAZOLAM 0.25 MG
1 TABLET ORAL ONCE
Refills: 0 | Status: DISCONTINUED | OUTPATIENT
Start: 2019-11-24 | End: 2019-11-24

## 2019-11-24 RX ADMIN — SODIUM CHLORIDE 75 MILLILITER(S): 9 INJECTION INTRAMUSCULAR; INTRAVENOUS; SUBCUTANEOUS at 06:33

## 2019-11-24 RX ADMIN — Medication 10 MILLIGRAM(S): at 18:19

## 2019-11-24 RX ADMIN — BUDESONIDE AND FORMOTEROL FUMARATE DIHYDRATE 2 PUFF(S): 160; 4.5 AEROSOL RESPIRATORY (INHALATION) at 07:59

## 2019-11-24 RX ADMIN — BUDESONIDE AND FORMOTEROL FUMARATE DIHYDRATE 2 PUFF(S): 160; 4.5 AEROSOL RESPIRATORY (INHALATION) at 21:14

## 2019-11-24 RX ADMIN — Medication 1 MILLIGRAM(S): at 12:17

## 2019-11-24 RX ADMIN — SODIUM CHLORIDE 75 MILLILITER(S): 9 INJECTION INTRAMUSCULAR; INTRAVENOUS; SUBCUTANEOUS at 21:14

## 2019-11-24 RX ADMIN — Medication 10 MILLIGRAM(S): at 21:43

## 2019-11-24 RX ADMIN — Medication 3 MILLIGRAM(S): at 21:42

## 2019-11-24 RX ADMIN — Medication 50 GRAM(S): at 18:19

## 2019-11-24 RX ADMIN — Medication 1 MILLIGRAM(S): at 21:43

## 2019-11-24 RX ADMIN — Medication 10 MILLIGRAM(S): at 08:58

## 2019-11-24 RX ADMIN — OXYCODONE HYDROCHLORIDE 30 MILLIGRAM(S): 5 TABLET ORAL at 06:32

## 2019-11-24 RX ADMIN — OXYCODONE HYDROCHLORIDE 30 MILLIGRAM(S): 5 TABLET ORAL at 15:41

## 2019-11-24 RX ADMIN — OXYCODONE HYDROCHLORIDE 30 MILLIGRAM(S): 5 TABLET ORAL at 22:13

## 2019-11-24 RX ADMIN — OXYCODONE HYDROCHLORIDE 30 MILLIGRAM(S): 5 TABLET ORAL at 21:43

## 2019-11-24 RX ADMIN — PANTOPRAZOLE SODIUM 40 MILLIGRAM(S): 20 TABLET, DELAYED RELEASE ORAL at 06:31

## 2019-11-24 RX ADMIN — Medication 650 MILLIGRAM(S): at 07:59

## 2019-11-24 RX ADMIN — SODIUM CHLORIDE 75 MILLILITER(S): 9 INJECTION INTRAMUSCULAR; INTRAVENOUS; SUBCUTANEOUS at 12:19

## 2019-11-24 NOTE — H&P ADULT - ASSESSMENT
# Abdominal pain      # COPD  -  - c/w home meds    # HTN    # HLD    # Fibromyalgia    Diet: NPO  Activity: as tolerated  GI PPX: IV PPI  DVT PPX: Lovenox 58 yo F with pmh of COPD (not on home O2), fibromyalgia, HLD (not on medication), GERD, and 2 brain aneurysms , presents with 1 month history of heartburn, nausea, vomiting (non bilious, non bloody), twisting abdominal pain (right sided, non-radiating) and diarrhea (non bloody). The patient notes that her abdominal pain is not associated with eating and she gets the pain intermittently throughout the day. The patient states that she has lost her appetite and is eating less than usual , however she denies having abdominal pain after eating. The patient also notes wheezing for the last few days.  The patient denies other review of systems except as noted above.     # Superior Mesenteric Artery Atherostenosis   Twisting abdominal pain, right sided twisting pain associated with nausea and vomiting and non-bloody diarrhea   - CT Abd and Pelvis with IV contrast: SMA stenosis  - f/u Vascular recommendations  - f/u GI recommendations  - keep NPO except meds for now   - IV PPI      # COPD  - not on home O2  - no wheezing on the exam  - c/w home meds    # HLD  - lipid panel f/u     # Fibromyalgia  - c/w oxycodone home medication    Diet: NPO  Activity: as tolerated  GI PPX: IV PPI  DVT PPX: hold off due to history of brain aneurysm / sequentials 60 yo F with pmh of COPD (not on home O2), fibromyalgia, HLD (not on medication), GERD, and 2 brain aneurysms , presents with 1 month history of heartburn, nausea, vomiting (non bilious, non bloody), twisting abdominal pain (right sided, non-radiating) and diarrhea (non bloody). The patient notes that her abdominal pain is not associated with eating and she gets the pain intermittently throughout the day. The patient states that she has lost her appetite and is eating less than usual , however she denies having abdominal pain after eating. The patient also notes wheezing for the last few days.  The patient denies other review of systems except as noted above.     # Superior Mesenteric Artery Atherostenosis   Twisting abdominal pain, right sided twisting pain associated with nausea and vomiting and non-bloody diarrhea   - CT Abd and Pelvis with IV contrast: SMA stenosis  - f/u Vascular recommendations  - f/u GI recommendations  - keep NPO except meds for now   - IV PPI    # Hyponatremia  -50cc of 3% saline   - f/u bmp    # COPD  - not on home O2  - no wheezing on the exam  - c/w home meds    # HLD  - lipid panel f/u     # Fibromyalgia  - c/w oxycodone home medication    Diet: NPO  Activity: as tolerated  GI PPX: IV PPI  DVT PPX: hold off due to history of brain aneurysm / sequentials 60 yo F with pmh of COPD (not on home O2), fibromyalgia, HLD (not on medication), GERD, and 2 brain aneurysms , presents with 1 month history of heartburn, nausea, vomiting (non bilious, non bloody), twisting abdominal pain (right sided, non-radiating) and diarrhea (non bloody). The patient notes that her abdominal pain is not associated with eating and she gets the pain intermittently throughout the day. The patient states that she has lost her appetite and is eating less than usual , however she denies having abdominal pain after eating. The patient also notes wheezing for the last few days.  The patient denies other review of systems except as noted above.     # Superior Mesenteric Artery Atherosclerosis  Twisting abdominal pain, right sided twisting pain associated with nausea and vomiting and non-bloody diarrhea   - CT Abd and Pelvis with IV contrast: SMA stenosis  - f/u Vascular recommendations  - f/u GI recommendations  - keep NPO except meds for now   - IV PPI    # Hyponatremia  -asymptomatic  - 0.9 % NS fluids   - f/u bmp    # COPD  - not on home O2  - no wheezing on the exam  - c/w home meds    # HLD  - lipid panel f/u     # Fibromyalgia  - c/w oxycodone home medication    Diet: NPO  Activity: as tolerated  GI PPX: IV PPI  DVT PPX: hold off due to history of brain aneurysm / sequentials

## 2019-11-24 NOTE — CONSULT NOTE ADULT - SUBJECTIVE AND OBJECTIVE BOX
Gastroenterology Consultation:    Patient is a 59y old  Female who presents with a chief complaint of Abdominal Pain (24 Nov 2019 00:13)      Admitted on: 11-24-19    HPI:  58 yo F with pmh of COPD (not on home O2), fibromyalgia, HLD (not on medication), GERD, and 2 brain aneurysms s/p Coil 2yrs ago, presents with 1 month history of heartburn, nausea, vomiting (non bilious, non bloody), twisting abdominal pain (right sided, non-radiating) and diarrhea (non bloody). The patient notes that her abdominal pain is not associated with eating and she gets the pain intermittently throughout the day. The patient states that she has lost her appetite and is eating less than usual , however she denies having abdominal pain after eating.      Prior records Reviewed (Y/N): Y  History obtained from person other than patient (Y/N): N    Prior EGD/Colon: None in system    PAST MEDICAL & SURGICAL HISTORY:  H/O aneurysm: brain  Fibromyalgia  Spinal stenosis  Hypercholesteremia  COPD (chronic obstructive pulmonary disease)  S/P cerebral aneurysm repair: coil x 20 yrs ago      FAMILY HISTORY:  FH: pancreatic cancer  FHx: breast cancer  Family history of myocardial infarction in first degree male relative of known age: &gt; 59 yo  Family hx of lung cancer: father      Social History:  Tobacco:  Alcohol:  Drugs:    Home Medications:  Breo Ellipta 200 mcg-25 mcg/inh inhalation powder: 1 puff(s) inhaled once a day (10 Jul 2019 17:53)  oxyCODONE 30 mg oral tablet: 1 tab(s) orally every 6 hours (10 Jul 2019 17:53)  pantoprazole 40 mg intravenous injection: 40 milligram(s) intravenous 2 times a day (24 Nov 2019 01:34)  Symbicort 160 mcg-4.5 mcg/inh inhalation aerosol: 2 puff(s) inhaled 2 times a day, As Needed.Last use 07/10/2019  (10 Jul 2019 17:53)  Xanax 1 mg oral tablet: 1 tab(s) orally once a day (10 Jul 2019 17:53)    MEDICATIONS  (STANDING):  ALPRAZolam 1 milliGRAM(s) Oral daily  budesonide 160 MICROgram(s)/formoterol 4.5 MICROgram(s) Inhaler 2 Puff(s) Inhalation two times a day  melatonin 3 milliGRAM(s) Oral once  metoclopramide Injectable 10 milliGRAM(s) IV Push once  pantoprazole  Injectable 40 milliGRAM(s) IV Push two times a day  sodium chloride 0.9%. 1000 milliLiter(s) (75 mL/Hr) IV Continuous <Continuous>    MEDICATIONS  (PRN):  acetaminophen   Tablet .. 650 milliGRAM(s) Oral every 6 hours PRN Moderate Pain (4 - 6)  oxyCODONE    IR 30 milliGRAM(s) Oral every 6 hours PRN Severe Pain (7 - 10)      Allergies  No Known Allergies      Review of Systems:   Constitutional:  No Fever, No Chills  ENT/Mouth:  No Hearing Changes,  No Difficulty Swallowing  Eyes:  No Eye Pain, No Vision Changes  Cardiovascular:  No Chest Pain, No Palpitations  Respiratory:  No Cough, No Dyspnea  Gastrointestinal:  As described in HPI  Musculoskeletal:  No Joint Swelling, No Back Pain  Skin:  No Skin Lesions, No Jaundice  Neuro:  No Syncope, No Dizziness  Heme/Lymph:  No Bruising, No Bleeding.          Physical Examination:  T(C): 36.8 (11-24-19 @ 08:10), Max: 36.8 (11-24-19 @ 08:10)  HR: 87 (11-24-19 @ 08:10) (87 - 104)  BP: 158/80 (11-24-19 @ 08:10) (118/53 - 158/80)  RR: 18 (11-24-19 @ 08:10) (18 - 20)  SpO2: 97% (11-24-19 @ 08:10) (97% - 99%)        Constitutional: No acute distress.  Eyes:. Conjunctivae are clear, Sclera is non-icteric.  Ears Nose and Throat: The external ears are normal appearing,  Oral mucosa is pink and moist.  Respiratory:  No signs of respiratory distress. Lung sounds are clear bilaterally.  Cardiovascular:  S1 S2, Regular rate and rhythm.  GI: Abdomen is soft, symmetric, and non-tender without distention. There are no visible lesions or scars. Bowel sounds are present and normoactive in all four quadrants. No masses, hepatomegaly, or splenomegaly are noted.   Neuro: No Tremor, No involuntary movements  Skin: No rashes, No Jaundice.          Data: (reviewed by attending)                        12.9   7.27  )-----------( 318      ( 23 Nov 2019 17:00 )             37.1     Hgb Trend:  12.9  11-23-19 @ 17:00      11-23    127<L>  |  91<L>  |  7<L>  ----------------------------<  93  4.4   |  20  |  0.5<L>    Ca    9.1      23 Nov 2019 17:00  Phos  3.5     11-23  Mg     1.8     11-23    TPro  6.9  /  Alb  4.5  /  TBili  <0.2  /  DBili  x   /  AST  35  /  ALT  25  /  AlkPhos  65  11-23    Liver panel trend:  TBili <0.2   /   AST 35   /   ALT 25   /   AlkP 65   /   Tptn 6.9   /   Alb 4.5    /   DBili --      11-23              Radiology:(reviewed by attending)  CT Abdomen and Pelvis w/ IV Cont:   EXAM:  CT ABDOMEN AND PELVIS IC            PROCEDURE DATE:  11/23/2019            INTERPRETATION:  Clinical History / Reason for exam: Abdominal pain.    Technique:  Contiguous axial CT images of the abdomen and pelvis were   obtained following administration of intravenous contrast.  Oral contrast   was not administered.  Reformatted images in the coronal and sagittal   planes were acquired.    Comparison CT: 7/30/2007  FINDINGS:    LOWER CHEST: There is mild bibasilar subsegmental atelectasis.    HEPATOBILIARY: The liver is normal in appearance.  No evidence of intra   or extrahepatic biliary dilatation. The gallbladder is suboptimally   imaged.    SPLEEN: Unremarkable.    PANCREAS: Unremarkable.    ADRENAL GLANDS: Unremarkable.    KIDNEYS: Symmetric pattern of renal enhancement.. No evidence of a mass,   hydronephrosis or hydroureter.    ABDOMINOPELVIC NODES: No enlarged abdominal or pelvic lymph nodes.    PELVIC ORGANS: The urinary bladder is distended with fluid.    PERITONEUM/MESENTERY/BOWEL: No bowel obstruction, ascites or free   intraperitoneal air. There are edematous changes of the gastric antrum   suspicious for gastritis. Upper endoscopy may be of benefit. Subhepatic   appendix is normal in caliber. There is sigmoid diverticulosis without   evidence of diverticulitis.    BONES/SOFT TISSUES: There are degenerative changes of the spine.    VASCULAR:  The aorta is normal in caliber. There is severe   atherosclerosis of the aorta and its branches with multifocal narrowing.   This is most pronounced at the proximal SMA where approximately 2.8 cm of   the vessel are essentially 95% occluded. Nevertheless, contrast is noted   more distally.    IMPRESSION:    Findings are suspicious for gastritis involving the antrum. Nonemergent   upper endoscopy may be of benefit.    Severe diffuse atherosclerosis with long segment of severe narrowing   involving proximal SMA.                  YAYA RAY M.D., ATTENDING RADIOLOGIST  This document has been electronically signed. Nov 23 2019 10:43PM             (11-23-19 @ 22:13) Gastroenterology Consultation:    Patient is a 59y old  Female who presents with a chief complaint of Abdominal Pain (24 Nov 2019 00:13)      Admitted on: 11-24-19    HPI:  60 yo F with pmh of COPD (not on home O2), fibromyalgia, spinal stenosis on Oxycodone, DLD, GERD, and 2 brain aneurysms s/p Coil 20 yrs ago, presents with 1 month history of heartburn, nausea, vomiting (non bilious, non bloody), twisting abdominal pain (right sided, non-radiating) and diarrhea (non bloody). The patient notes that her abdominal pain is not associated with eating and she gets the pain intermittently throughout the day. The patient states that she has lost her appetite and is eating less than usual , however she denies having abdominal pain after eating.      Prior records Reviewed (Y/N): Y  History obtained from person other than patient (Y/N): N    Prior EGD/Colon: None in system    PAST MEDICAL & SURGICAL HISTORY:  H/O aneurysm: brain  Fibromyalgia  Spinal stenosis  Hypercholesteremia  COPD (chronic obstructive pulmonary disease)  S/P cerebral aneurysm repair: coil x 20 yrs ago      FAMILY HISTORY:  FH: pancreatic cancer  FHx: breast cancer  Family history of myocardial infarction in first degree male relative of known age: &gt; 61 yo  Family hx of lung cancer: father      Social History:  Tobacco:  Alcohol:  Drugs:    Home Medications:  Breo Ellipta 200 mcg-25 mcg/inh inhalation powder: 1 puff(s) inhaled once a day (10 Jul 2019 17:53)  oxyCODONE 30 mg oral tablet: 1 tab(s) orally every 6 hours (10 Jul 2019 17:53)  pantoprazole 40 mg intravenous injection: 40 milligram(s) intravenous 2 times a day (24 Nov 2019 01:34)  Symbicort 160 mcg-4.5 mcg/inh inhalation aerosol: 2 puff(s) inhaled 2 times a day, As Needed.Last use 07/10/2019  (10 Jul 2019 17:53)  Xanax 1 mg oral tablet: 1 tab(s) orally once a day (10 Jul 2019 17:53)    MEDICATIONS  (STANDING):  ALPRAZolam 1 milliGRAM(s) Oral daily  budesonide 160 MICROgram(s)/formoterol 4.5 MICROgram(s) Inhaler 2 Puff(s) Inhalation two times a day  melatonin 3 milliGRAM(s) Oral once  metoclopramide Injectable 10 milliGRAM(s) IV Push once  pantoprazole  Injectable 40 milliGRAM(s) IV Push two times a day  sodium chloride 0.9%. 1000 milliLiter(s) (75 mL/Hr) IV Continuous <Continuous>    MEDICATIONS  (PRN):  acetaminophen   Tablet .. 650 milliGRAM(s) Oral every 6 hours PRN Moderate Pain (4 - 6)  oxyCODONE    IR 30 milliGRAM(s) Oral every 6 hours PRN Severe Pain (7 - 10)      Allergies  No Known Allergies      Review of Systems:   Constitutional:  No Fever, No Chills  ENT/Mouth:  No Hearing Changes,  No Difficulty Swallowing  Eyes:  No Eye Pain, No Vision Changes  Cardiovascular:  No Chest Pain, No Palpitations  Respiratory:  No Cough, No Dyspnea  Gastrointestinal:  As described in HPI  Musculoskeletal:  No Joint Swelling, No Back Pain  Skin:  No Skin Lesions, No Jaundice  Neuro:  No Syncope, No Dizziness  Heme/Lymph:  No Bruising, No Bleeding.          Physical Examination:  T(C): 36.8 (11-24-19 @ 08:10), Max: 36.8 (11-24-19 @ 08:10)  HR: 87 (11-24-19 @ 08:10) (87 - 104)  BP: 158/80 (11-24-19 @ 08:10) (118/53 - 158/80)  RR: 18 (11-24-19 @ 08:10) (18 - 20)  SpO2: 97% (11-24-19 @ 08:10) (97% - 99%)        Constitutional: No acute distress.  Eyes:. Conjunctivae are clear, Sclera is non-icteric.  Ears Nose and Throat: The external ears are normal appearing,  Oral mucosa is pink and moist.  Respiratory:  No signs of respiratory distress. Lung sounds are clear bilaterally.  Cardiovascular:  S1 S2, Regular rate and rhythm.  GI: Abdomen is soft, symmetric, and non-tender without distention. There are no visible lesions or scars. Bowel sounds are present and normoactive in all four quadrants. No masses, hepatomegaly, or splenomegaly are noted.   Neuro: No Tremor, No involuntary movements  Skin: No rashes, No Jaundice.          Data: (reviewed by attending)                        12.9   7.27  )-----------( 318      ( 23 Nov 2019 17:00 )             37.1     Hgb Trend:  12.9  11-23-19 @ 17:00      11-23    127<L>  |  91<L>  |  7<L>  ----------------------------<  93  4.4   |  20  |  0.5<L>    Ca    9.1      23 Nov 2019 17:00  Phos  3.5     11-23  Mg     1.8     11-23    TPro  6.9  /  Alb  4.5  /  TBili  <0.2  /  DBili  x   /  AST  35  /  ALT  25  /  AlkPhos  65  11-23    Liver panel trend:  TBili <0.2   /   AST 35   /   ALT 25   /   AlkP 65   /   Tptn 6.9   /   Alb 4.5    /   DBili --      11-23              Radiology:(reviewed by attending)  CT Abdomen and Pelvis w/ IV Cont:   EXAM:  CT ABDOMEN AND PELVIS IC            PROCEDURE DATE:  11/23/2019            INTERPRETATION:  Clinical History / Reason for exam: Abdominal pain.    Technique:  Contiguous axial CT images of the abdomen and pelvis were   obtained following administration of intravenous contrast.  Oral contrast   was not administered.  Reformatted images in the coronal and sagittal   planes were acquired.    Comparison CT: 7/30/2007  FINDINGS:    LOWER CHEST: There is mild bibasilar subsegmental atelectasis.    HEPATOBILIARY: The liver is normal in appearance.  No evidence of intra   or extrahepatic biliary dilatation. The gallbladder is suboptimally   imaged.    SPLEEN: Unremarkable.    PANCREAS: Unremarkable.    ADRENAL GLANDS: Unremarkable.    KIDNEYS: Symmetric pattern of renal enhancement.. No evidence of a mass,   hydronephrosis or hydroureter.    ABDOMINOPELVIC NODES: No enlarged abdominal or pelvic lymph nodes.    PELVIC ORGANS: The urinary bladder is distended with fluid.    PERITONEUM/MESENTERY/BOWEL: No bowel obstruction, ascites or free   intraperitoneal air. There are edematous changes of the gastric antrum   suspicious for gastritis. Upper endoscopy may be of benefit. Subhepatic   appendix is normal in caliber. There is sigmoid diverticulosis without   evidence of diverticulitis.    BONES/SOFT TISSUES: There are degenerative changes of the spine.    VASCULAR:  The aorta is normal in caliber. There is severe   atherosclerosis of the aorta and its branches with multifocal narrowing.   This is most pronounced at the proximal SMA where approximately 2.8 cm of   the vessel are essentially 95% occluded. Nevertheless, contrast is noted   more distally.    IMPRESSION:    Findings are suspicious for gastritis involving the antrum. Nonemergent   upper endoscopy may be of benefit.    Severe diffuse atherosclerosis with long segment of severe narrowing   involving proximal SMA.                  YAYA RAY M.D., ATTENDING RADIOLOGIST  This document has been electronically signed. Nov 23 2019 10:43PM             (11-23-19 @ 22:13) Gastroenterology Consultation:    Patient is a 59y old  Female who presents with a chief complaint of Abdominal Pain (24 Nov 2019 00:13)      Admitted on: 11-24-19    HPI:  60 yo F with pmh of COPD (not on home O2), heavy active smoker fibromyalgia, spinal stenosis on Oxycodone, DLD, GERD, and 2 brain aneurysms s/p Coil 20 yrs ago, presents with 2 month history of heartburn, nausea, vomiting (non bilious, non bloody), burning epigastric pain, radiating to R side abdomen and sometimes is twisting on the R side with 2-3 episodes of loose brown BM, intermittently (non bloody). Patient states the abdominal pain is not related to eating but she feels nausea and vomiting after eating within an hour and that is why she is reducing her food intake. She does take advil 2-3 tabs everyday for arthritis for the last 1 yr. She also reports heartburn everyday and she lost few pounds in last 1 month.  The patient states that she has lost her appetite and is eating less than usual.      Prior records Reviewed (Y/N): Y  History obtained from person other than patient (Y/N): N      Prior EGD/Colon: None in system  Last EGD 1 yr ago with Dr. Parra, showed some reflux disease (No EGD report)  Colonoscopy 1-2 yrs back which showed some polyps which was removed.    PAST MEDICAL & SURGICAL HISTORY:  H/O aneurysm: brain  Fibromyalgia  Spinal stenosis  Hypercholesteremia  COPD (chronic obstructive pulmonary disease)  S/P cerebral aneurysm repair: coil x 20 yrs ago      FAMILY HISTORY:  FH: pancreatic cancer in Uncle  Gastric Cancer in grandfather  Family history of myocardial infarction in first degree male relative of known age: &gt; 59 yo  Family hx of lung cancer: father      Social History:  Active smoker  Denies alcohol    Home Medications:  Breo Ellipta 200 mcg-25 mcg/inh inhalation powder: 1 puff(s) inhaled once a day (10 Jul 2019 17:53)  oxyCODONE 30 mg oral tablet: 1 tab(s) orally every 6 hours (10 Jul 2019 17:53)  Symbicort 160 mcg-4.5 mcg/inh inhalation aerosol: 2 puff(s) inhaled 2 times a day, As Needed.Last use 07/10/2019  (10 Jul 2019 17:53)  Xanax 1 mg oral tablet: 1 tab(s) orally once a day (10 Jul 2019 17:53)    MEDICATIONS  (STANDING):  ALPRAZolam 1 milliGRAM(s) Oral daily  budesonide 160 MICROgram(s)/formoterol 4.5 MICROgram(s) Inhaler 2 Puff(s) Inhalation two times a day  melatonin 3 milliGRAM(s) Oral once  metoclopramide Injectable 10 milliGRAM(s) IV Push once  pantoprazole  Injectable 40 milliGRAM(s) IV Push two times a day  sodium chloride 0.9%. 1000 milliLiter(s) (75 mL/Hr) IV Continuous <Continuous>    MEDICATIONS  (PRN):  acetaminophen   Tablet .. 650 milliGRAM(s) Oral every 6 hours PRN Moderate Pain (4 - 6)  oxyCODONE    IR 30 milliGRAM(s) Oral every 6 hours PRN Severe Pain (7 - 10)      Allergies  No Known Allergies      Review of Systems:   Constitutional:  No Fever, No Chills  ENT/Mouth:  No Hearing Changes,  No Difficulty Swallowing  Eyes:  No Eye Pain, No Vision Changes  Cardiovascular:  No Chest Pain, No Palpitations  Respiratory:  No Cough, No Dyspnea  Gastrointestinal:  As described in HPI  Musculoskeletal:  No Joint Swelling, No Back Pain  Skin:  No Skin Lesions, No Jaundice  Neuro:  No Syncope, No Dizziness  Heme/Lymph:  No Bruising, No Bleeding.          Physical Examination:  T(C): 36.8 (11-24-19 @ 08:10), Max: 36.8 (11-24-19 @ 08:10)  HR: 87 (11-24-19 @ 08:10) (87 - 104)  BP: 158/80 (11-24-19 @ 08:10) (118/53 - 158/80)  RR: 18 (11-24-19 @ 08:10) (18 - 20)  SpO2: 97% (11-24-19 @ 08:10) (97% - 99%)        Constitutional: No acute distress.  Eyes:. Conjunctivae are clear, Sclera is non-icteric.  Ears Nose and Throat: The external ears are normal appearing,  Oral mucosa is pink and moist.  Respiratory:  No signs of respiratory distress. Lung sounds are clear bilaterally.  Cardiovascular:  S1 S2, Regular rate and rhythm.  GI: Abdomen is soft, symmetric, and non-tender without distention. There are no visible lesions or scars. Bowel sounds are present and normoactive in all four quadrants. No masses, hepatomegaly, or splenomegaly are noted.   Neuro: No Tremor, No involuntary movements  Skin: No rashes, No Jaundice.          Data: (reviewed by attending)                        12.9   7.27  )-----------( 318      ( 23 Nov 2019 17:00 )             37.1     Hgb Trend:  12.9  11-23-19 @ 17:00      11-23    127<L>  |  91<L>  |  7<L>  ----------------------------<  93  4.4   |  20  |  0.5<L>    Ca    9.1      23 Nov 2019 17:00  Phos  3.5     11-23  Mg     1.8     11-23    TPro  6.9  /  Alb  4.5  /  TBili  <0.2  /  DBili  x   /  AST  35  /  ALT  25  /  AlkPhos  65  11-23    Liver panel trend:  TBili <0.2   /   AST 35   /   ALT 25   /   AlkP 65   /   Tptn 6.9   /   Alb 4.5    /   DBili --      11-23              Radiology:(reviewed by attending)  CT Abdomen and Pelvis w/ IV Cont:   EXAM:  CT ABDOMEN AND PELVIS IC            PROCEDURE DATE:  11/23/2019            INTERPRETATION:  Clinical History / Reason for exam: Abdominal pain.    Technique:  Contiguous axial CT images of the abdomen and pelvis were   obtained following administration of intravenous contrast.  Oral contrast   was not administered.  Reformatted images in the coronal and sagittal   planes were acquired.    Comparison CT: 7/30/2007  FINDINGS:    LOWER CHEST: There is mild bibasilar subsegmental atelectasis.    HEPATOBILIARY: The liver is normal in appearance.  No evidence of intra   or extrahepatic biliary dilatation. The gallbladder is suboptimally   imaged.    SPLEEN: Unremarkable.    PANCREAS: Unremarkable.    ADRENAL GLANDS: Unremarkable.    KIDNEYS: Symmetric pattern of renal enhancement.. No evidence of a mass,   hydronephrosis or hydroureter.    ABDOMINOPELVIC NODES: No enlarged abdominal or pelvic lymph nodes.    PELVIC ORGANS: The urinary bladder is distended with fluid.    PERITONEUM/MESENTERY/BOWEL: No bowel obstruction, ascites or free   intraperitoneal air. There are edematous changes of the gastric antrum   suspicious for gastritis. Upper endoscopy may be of benefit. Subhepatic   appendix is normal in caliber. There is sigmoid diverticulosis without   evidence of diverticulitis.    BONES/SOFT TISSUES: There are degenerative changes of the spine.    VASCULAR:  The aorta is normal in caliber. There is severe   atherosclerosis of the aorta and its branches with multifocal narrowing.   This is most pronounced at the proximal SMA where approximately 2.8 cm of   the vessel are essentially 95% occluded. Nevertheless, contrast is noted   more distally.    IMPRESSION:    Findings are suspicious for gastritis involving the antrum. Nonemergent   upper endoscopy may be of benefit.    Severe diffuse atherosclerosis with long segment of severe narrowing   involving proximal SMA.                  YAYA RAY M.D., ATTENDING RADIOLOGIST  This document has been electronically signed. Nov 23 2019 10:43PM             (11-23-19 @ 22:13)

## 2019-11-24 NOTE — H&P ADULT - NSICDXFAMILYHX_GEN_ALL_CORE_FT
Patient c/o feeling cold and has constipation x 4 days as per EMS. FAMILY HISTORY:  Family history of myocardial infarction in first degree male relative of known age, > 59 yo  Family hx of lung cancer, father  FH: pancreatic cancer  FHx: breast cancer

## 2019-11-24 NOTE — H&P ADULT - HISTORY OF PRESENT ILLNESS
58 yo F with pmh of COPD, fibromyalgia , HTN, and HLD presents with 1 month history of nausea, vomiting, and 60 yo F with pmh of COPD, fibromyalgia , HTN, and HLD presents with 1 month history of nausea, vomiting (non bilious, non bloody) diffuse abdominal pain and diarrhea. 58 yo F with pmh of COPD (not on home O2), fibromyalgia, HLD (not on medication), GERD, and 2 brain aneurysms , presents with 1 month history of heartburn, nausea, vomiting (non bilious, non bloody), twisting abdominal pain (right sided, non-radiating) and diarrhea (non bloody). The patient notes that her abdominal pain is not associated with eating and she gets the pain intermittently throughout the day. The patient states that she has lost her appetite and is eating less than usual , however she denies having abdominal pain after eating. The patient also notes wheezing for the last few days.  The patient denies other review of systems except as noted above.

## 2019-11-24 NOTE — H&P ADULT - NSHPLABSRESULTS_GEN_ALL_CORE
12.9   7.27  )-----------( 318      ( 23 Nov 2019 17:00 )             37.1     11-23-19 @ 17:00    127<L>  |  91<L>  |  7<L>             --------------------------< 93     4.4  |  20  | 0.5<L>    eGFR AA: 123  eGFR N-AA: 106    Calcium: 9.1  Phosphorus: 3.5  Magnesium: 1.8    AST: 35    ALT: 25  AlkPhos: 65  Protein: 6.9  Albumin: 4.5  TBili: <0.2  D-Bili: --      < from: CT Abdomen and Pelvis w/ IV Cont (11.23.19 @ 22:13) >    IMPRESSION:    Findings are suspicious for gastritis involving the antrum. Nonemergent   upper endoscopy may be of benefit.    Severe diffuse atherosclerosis with long segment of severe narrowing   involving proximal SMA.      < end of copied text >      < from: Xray Chest 2 Views PA/Lat (11.23.19 @ 17:57) >      IMPRESSION:  No evidence of acute cardiopulmonary disease.     < end of copied text > 12.9   7.27  )-----------( 318      ( 23 Nov 2019 17:00 )             37.1       11-23-19 @ 17:00    127<L>  |  91<L>  |  7<L>             --------------------------< 93     4.4  |  20  | 0.5<L>    eGFR AA: 123  eGFR N-AA: 106    Calcium: 9.1  Phosphorus: 3.5  Magnesium: 1.8    AST: 35    ALT: 25  AlkPhos: 65  Protein: 6.9  Albumin: 4.5  TBili: <0.2  D-Bili: --    < from: CT Abdomen and Pelvis w/ IV Cont (11.23.19 @ 22:13) >    IMPRESSION:    Findings are suspicious for gastritis involving the antrum. Nonemergent   upper endoscopy may be of benefit.    Severe diffuse atherosclerosis with long segment of severe narrowing   involving proximal SMA.      < end of copied text >      < from: Xray Chest 2 Views PA/Lat (11.23.19 @ 17:57) >      IMPRESSION:  No evidence of acute cardiopulmonary disease.     < end of copied text >

## 2019-11-24 NOTE — CONSULT NOTE ADULT - ASSESSMENT
59 y F with multiple medical comorbidities presents with COPD exacerbation and 1 month of abdominal pain, nausea and vomiting.      Plan   will discuss with attending
60 yo F with pmh of COPD (not on home O2), heavy active smoker fibromyalgia, spinal stenosis on Oxycodone, GERD not on PPI and 2 brain aneurysms s/p Coil 20 yrs ago, presents with 2 month history of heartburn, nausea, vomiting after eating (non bilious, non bloody), burning epigastric pain, radiating to R side abdomen and sometimes is twisting on the R side with 2-3 episodes of loose brown BM, intermittently (non bloody). She lost weight, has FH of gastric cancer and pancreatic cancer. NSAID use is positive.    # Abdominal pain/vomiting/diarrhea: DD includes PUD vs Gastric Cancer vs mesenteric ischemia  CT abdomen showed 95% occlusion of SMA, celiac axis patent, edema in antrum.      Rec:  Will perform EGD on Monday 11/25/2019)  Can have clear liquids, keep her NPO after midnight.  Start on PPI IV BID.  Monitor electrolytes, give IVF.  Consider vascular consult for SMA stenosis.

## 2019-11-24 NOTE — H&P ADULT - ATTENDING COMMENTS
Chart reviewed, patient examined. Pertinent results reviewed.  Case discussed with HO; specialist f/u reviewed  HD#2--ED late 11/23; pt feels better.  Pt is NPO for EGD today; on PPI- BID  Vascular evaluation noted;  Advised  pt to DC smoking. Continue Chantix. Avoid ETOH & NSAIDs over next 2 months.  Would start statin for pt to lower Cholesterol levels.  Continue her COPD meds.  REviewed with team; can DC after EGD.

## 2019-11-24 NOTE — H&P ADULT - NSHPPHYSICALEXAM_GEN_ALL_CORE
Vital Signs (24 Hrs):  T(C): 36.2 (11-23-19 @ 23:50), Max: 36.2 (11-23-19 @ 23:50)  HR: 88 (11-23-19 @ 23:50) (88 - 90)  BP: 139/64 (11-23-19 @ 23:50) (118/53 - 139/64)  RR: 18 (11-23-19 @ 23:50) (18 - 18)  SpO2: 99% (11-23-19 @ 15:56) (99% - 99%)  Wt(kg): --  Daily     Daily     I&O's Summary    PHYSICAL EXAM:  GENERAL: NAD, lying in bed comfortably  HEAD:  Atraumatic, Normocephalic  EYES: EOMI, PERRLA, conjunctiva and sclera clear  ENT: Moist mucous membranes  NECK: Supple, No JVD  CHEST/LUNG: Clear to auscultation bilaterally; No rales, rhonchi, wheezing, or rubs. Unlabored respirations  HEART: Regular rate and rhythm; No murmurs, rubs, or gallops  ABDOMEN: Bowel sounds present; Soft, Nontender, Nondistended. No hepatomegally  EXTREMITIES:  2+ Peripheral Pulses, brisk capillary refill. No clubbing, cyanosis, or edema  NERVOUS SYSTEM:  Alert & Oriented X3, speech clear. No deficits   MSK: FROM all 4 extremities, full and equal strength  SKIN: No rashes or lesions

## 2019-11-24 NOTE — H&P ADULT - NSHPREVIEWOFSYSTEMS_GEN_ALL_CORE
REVIEW OF SYSTEMS:    CONSTITUTIONAL: No weakness, fevers or chills  EYES/ENT: No visual changes;  No vertigo or throat pain   NECK: No pain or stiffness  RESPIRATORY: No cough, +WHEEZING, hemoptysis; No shortness of breath  CARDIOVASCULAR: No chest pain or palpitations  GASTROINTESTINAL: +RIGHT SIDED NON-RADIATING TWISTING ABDOMINAL PAIN. + NAUSEA/ NBNB VOMITING ,  no hematemesis; + NON-BLOODY DIARRHEA . No melena or hematochezia.  GENITOURINARY: No dysuria, frequency or hematuria  NEUROLOGICAL: No numbness or weakness; no focal deficits  SKIN: No itching, rashes

## 2019-11-24 NOTE — H&P ADULT - NSHPSOCIALHISTORY_GEN_ALL_CORE
Active smoker (1-2 cigarettes per day)   Social drinker (occasional) , no drug use Active smoker (1-2 cigarettes per day)   Social drinker (occasional) , no drug use  Taking Chantix from PCP

## 2019-11-25 ENCOUNTER — TRANSCRIPTION ENCOUNTER (OUTPATIENT)
Age: 59
End: 2019-11-25

## 2019-11-25 ENCOUNTER — RESULT REVIEW (OUTPATIENT)
Age: 59
End: 2019-11-25

## 2019-11-25 DIAGNOSIS — E78.5 HYPERLIPIDEMIA, UNSPECIFIED: ICD-10-CM

## 2019-11-25 LAB
HCT VFR BLD CALC: 30.1 % — LOW (ref 37–47)
HCT VFR BLD CALC: 33.2 % — LOW (ref 37–47)
HCV AB S/CO SERPL IA: 0.15 S/CO — SIGNIFICANT CHANGE UP (ref 0–0.99)
HCV AB SERPL-IMP: SIGNIFICANT CHANGE UP
HGB BLD-MCNC: 10 G/DL — LOW (ref 12–16)
HGB BLD-MCNC: 10.8 G/DL — LOW (ref 12–16)
MCHC RBC-ENTMCNC: 32.1 PG — HIGH (ref 27–31)
MCHC RBC-ENTMCNC: 32.1 PG — HIGH (ref 27–31)
MCHC RBC-ENTMCNC: 32.5 G/DL — SIGNIFICANT CHANGE UP (ref 32–37)
MCHC RBC-ENTMCNC: 33.2 G/DL — SIGNIFICANT CHANGE UP (ref 32–37)
MCV RBC AUTO: 96.5 FL — SIGNIFICANT CHANGE UP (ref 81–99)
MCV RBC AUTO: 98.8 FL — SIGNIFICANT CHANGE UP (ref 81–99)
NRBC # BLD: 0 /100 WBCS — SIGNIFICANT CHANGE UP (ref 0–0)
NRBC # BLD: 0 /100 WBCS — SIGNIFICANT CHANGE UP (ref 0–0)
PLATELET # BLD AUTO: 256 K/UL — SIGNIFICANT CHANGE UP (ref 130–400)
PLATELET # BLD AUTO: 299 K/UL — SIGNIFICANT CHANGE UP (ref 130–400)
RBC # BLD: 3.12 M/UL — LOW (ref 4.2–5.4)
RBC # BLD: 3.36 M/UL — LOW (ref 4.2–5.4)
RBC # FLD: 13.4 % — SIGNIFICANT CHANGE UP (ref 11.5–14.5)
RBC # FLD: 13.6 % — SIGNIFICANT CHANGE UP (ref 11.5–14.5)
WBC # BLD: 6.95 K/UL — SIGNIFICANT CHANGE UP (ref 4.8–10.8)
WBC # BLD: 7.58 K/UL — SIGNIFICANT CHANGE UP (ref 4.8–10.8)
WBC # FLD AUTO: 6.95 K/UL — SIGNIFICANT CHANGE UP (ref 4.8–10.8)
WBC # FLD AUTO: 7.58 K/UL — SIGNIFICANT CHANGE UP (ref 4.8–10.8)

## 2019-11-25 PROCEDURE — 88305 TISSUE EXAM BY PATHOLOGIST: CPT | Mod: 26

## 2019-11-25 PROCEDURE — 88312 SPECIAL STAINS GROUP 1: CPT | Mod: 26

## 2019-11-25 PROCEDURE — 43239 EGD BIOPSY SINGLE/MULTIPLE: CPT

## 2019-11-25 RX ORDER — CHLORHEXIDINE GLUCONATE 213 G/1000ML
1 SOLUTION TOPICAL
Refills: 0 | Status: DISCONTINUED | OUTPATIENT
Start: 2019-11-25 | End: 2019-11-26

## 2019-11-25 RX ORDER — LANOLIN ALCOHOL/MO/W.PET/CERES
5 CREAM (GRAM) TOPICAL AT BEDTIME
Refills: 0 | Status: DISCONTINUED | OUTPATIENT
Start: 2019-11-25 | End: 2019-11-26

## 2019-11-25 RX ORDER — ATORVASTATIN CALCIUM 80 MG/1
20 TABLET, FILM COATED ORAL AT BEDTIME
Refills: 0 | Status: DISCONTINUED | OUTPATIENT
Start: 2019-11-25 | End: 2019-11-26

## 2019-11-25 RX ORDER — ONDANSETRON 8 MG/1
4 TABLET, FILM COATED ORAL ONCE
Refills: 0 | Status: COMPLETED | OUTPATIENT
Start: 2019-11-25 | End: 2019-11-25

## 2019-11-25 RX ORDER — PANTOPRAZOLE SODIUM 20 MG/1
1 TABLET, DELAYED RELEASE ORAL
Qty: 30 | Refills: 0
Start: 2019-11-25 | End: 2019-12-24

## 2019-11-25 RX ORDER — ATORVASTATIN CALCIUM 80 MG/1
1 TABLET, FILM COATED ORAL
Qty: 30 | Refills: 0
Start: 2019-11-25 | End: 2019-12-24

## 2019-11-25 RX ORDER — PANTOPRAZOLE SODIUM 20 MG/1
40 TABLET, DELAYED RELEASE ORAL
Refills: 0 | Status: DISCONTINUED | OUTPATIENT
Start: 2019-11-25 | End: 2019-11-26

## 2019-11-25 RX ADMIN — Medication 10 MILLIGRAM(S): at 05:15

## 2019-11-25 RX ADMIN — BUDESONIDE AND FORMOTEROL FUMARATE DIHYDRATE 2 PUFF(S): 160; 4.5 AEROSOL RESPIRATORY (INHALATION) at 08:07

## 2019-11-25 RX ADMIN — OXYCODONE HYDROCHLORIDE 30 MILLIGRAM(S): 5 TABLET ORAL at 04:44

## 2019-11-25 RX ADMIN — ONDANSETRON 4 MILLIGRAM(S): 8 TABLET, FILM COATED ORAL at 10:14

## 2019-11-25 RX ADMIN — Medication 5 MILLIGRAM(S): at 21:51

## 2019-11-25 RX ADMIN — OXYCODONE HYDROCHLORIDE 30 MILLIGRAM(S): 5 TABLET ORAL at 23:00

## 2019-11-25 RX ADMIN — OXYCODONE HYDROCHLORIDE 30 MILLIGRAM(S): 5 TABLET ORAL at 04:14

## 2019-11-25 RX ADMIN — PANTOPRAZOLE SODIUM 40 MILLIGRAM(S): 20 TABLET, DELAYED RELEASE ORAL at 05:14

## 2019-11-25 RX ADMIN — Medication 1 MILLIGRAM(S): at 11:22

## 2019-11-25 RX ADMIN — BUDESONIDE AND FORMOTEROL FUMARATE DIHYDRATE 2 PUFF(S): 160; 4.5 AEROSOL RESPIRATORY (INHALATION) at 21:51

## 2019-11-25 RX ADMIN — ATORVASTATIN CALCIUM 20 MILLIGRAM(S): 80 TABLET, FILM COATED ORAL at 21:52

## 2019-11-25 RX ADMIN — PANTOPRAZOLE SODIUM 40 MILLIGRAM(S): 20 TABLET, DELAYED RELEASE ORAL at 17:08

## 2019-11-25 RX ADMIN — OXYCODONE HYDROCHLORIDE 30 MILLIGRAM(S): 5 TABLET ORAL at 23:30

## 2019-11-25 RX ADMIN — OXYCODONE HYDROCHLORIDE 30 MILLIGRAM(S): 5 TABLET ORAL at 17:14

## 2019-11-25 RX ADMIN — SODIUM CHLORIDE 75 MILLILITER(S): 9 INJECTION INTRAMUSCULAR; INTRAVENOUS; SUBCUTANEOUS at 11:14

## 2019-11-25 RX ADMIN — OXYCODONE HYDROCHLORIDE 30 MILLIGRAM(S): 5 TABLET ORAL at 16:44

## 2019-11-25 NOTE — DISCHARGE NOTE PROVIDER - NSDCFUADDINST_GEN_ALL_CORE_FT
We started new medication for you, please pick them up at the pharmacy  - Atorvastatin 20 mg once daily in the evening   - We started new medication for you, please pick them up at the pharmacy  - Atorvastatin 20 mg once daily in the evening   - Pantoprazole 40 mg before breakfast once a day We started new medication for you, please pick them up at the pharmacy  - Atorvastatin 20 mg once daily in the evening   - Pantoprazole 40 mg before breakfast once a day  - Cefdinir 300 mg twice a day for 10 days

## 2019-11-25 NOTE — DISCHARGE NOTE PROVIDER - NSDCCPGOAL_GEN_ALL_CORE_FT
To get better and follow your care plan as instructed.
48 yo pt with SOB and possible apnic episode while sleeping.  Check labs, cxr

## 2019-11-25 NOTE — PROGRESS NOTE ADULT - SUBJECTIVE AND OBJECTIVE BOX
SUBJECTIVE:    Patient is a 59y old Female who presents with a chief complaint of Abdominal Pain (2019 08:58)    Currently admitted to medicine with the primary diagnosis of Hyponatremia     Today is hospital day 1d. This morning she is resting comfortably in bed and reports no new issues or overnight events.     ROS:   CONSTITUTIONAL: No weakness, fevers or chills   EYES/ENT: No visual changes; No vertigo or throat pain   NECK: No pain or stiffness   RESPIRATORY: wheezing, no hemoptysis; No shortness of breath   CARDIOVASCULAR: No chest pain or palpitations   GASTROINTESTINAL: mild abdominal pain; nausea and 1 NBNB episode of vomiting, no hematemesis; No diarrhea or constipation. No melena or hematochezia.  GENITOURINARY: No dysuria, frequency or hematuria  NEUROLOGICAL: No numbness or weakness  SKIN: No itching, rashes      PAST MEDICAL & SURGICAL HISTORY  H/O aneurysm: brain  SOB (shortness of breath)  Fibromyalgia  Spinal stenosis  Hypercholesteremia  COPD (chronic obstructive pulmonary disease)  S/P cerebral aneurysm repair: coil x 20 yrs ago    SOCIAL HISTORY:  Negative for smoking/alcohol/drug use.   ALLERGIES:  No Known Allergies    MEDICATIONS:  STANDING MEDICATIONS  ALPRAZolam 1 milliGRAM(s) Oral daily  budesonide 160 MICROgram(s)/formoterol 4.5 MICROgram(s) Inhaler 2 Puff(s) Inhalation two times a day  chlorhexidine 4% Liquid 1 Application(s) Topical <User Schedule>  ondansetron Injectable 4 milliGRAM(s) IV Push once  pantoprazole  Injectable 40 milliGRAM(s) IV Push two times a day  sodium chloride 0.9%. 1000 milliLiter(s) IV Continuous <Continuous>    PRN MEDICATIONS  acetaminophen   Tablet .. 650 milliGRAM(s) Oral every 6 hours PRN  oxyCODONE    IR 30 milliGRAM(s) Oral every 6 hours PRN    VITALS:   T(F): 96.8  HR: 77  BP: 129/56  RR: 18  SpO2: 94%    LABS:                          10.8   6.95  )-----------( 299      ( 2019 05:25 )             33.2     11-24    136  |  99  |  9<L>  ----------------------------<  114<H>  5.0   |  23  |  0.6<L>    Ca    8.9      2019 11:25  Phos  3.5       Mg     2.6         TPro  6.2  /  Alb  4.2  /  TBili  <0.2  /  DBili  x   /  AST  24  /  ALT  19  /  AlkPhos  59  11-24    PT/INR - ( 2019 20:00 )   PT: 10.80 sec;   INR: 0.94 ratio         PTT - ( 2019 20:00 )  PTT:30.1 sec  Urinalysis Basic - ( 2019 16:43 )    Color: Colorless / Appearance: Clear / S.006 / pH: x  Gluc: x / Ketone: Negative  / Bili: Negative / Urobili: <2 mg/dL   Blood: x / Protein: Negative / Nitrite: Negative   Leuk Esterase: Moderate / RBC: 1 /HPF / WBC 16 /HPF   Sq Epi: x / Non Sq Epi: 8 /HPF / Bacteria: Negative      CARDIAC MARKERS ( 2019 17:00 )  x     / <0.01 ng/mL / x     / x     / x          RADIOLOGY:  < from: CT Abdomen and Pelvis w/ IV Cont (19 @ 22:13) >  IMPRESSION:    Findings are suspicious for gastritis involving the antrum. Nonemergent   upper endoscopy may be of benefit.    Severe diffuse atherosclerosis with long segment of severe narrowing   involving proximal SMA.    < end of copied text >    PHYSICAL EXAM:  GEN: No acute distress  HEENT: normocephalic, atraumatic, aniceteric  LUNGS: Clear to auscultation bilaterally, + wheezing  HEART: S1/S2 present. RRR, no murmurs  ABD: Soft, RUQ and RLQ tenderness IMPROVED, non-distended. Bowel sounds present; huff sign negative   EXT: NC/NC/NE/2+PP/RUIZ  NEURO: AAOX3, normal affect      ASSESSMENT AND PLAN:    60 yo F with pmh of COPD (not on home O2), fibromyalgia, HLD (not on medication), GERD, and 2 brain aneurysms , presents with 1 month history of heartburn, nausea, vomiting (non bilious, non bloody), twisting abdominal pain (right sided, non-radiating) and diarrhea (non bloody). The patient notes that her abdominal pain is not associated with eating and she gets the pain intermittently throughout the day. The patient states that she has lost her appetite and is eating less than usual , however she denies having abdominal pain after eating. The patient also notes wheezing for the last few days.  The patient denies other review of systems except as noted above.     # Superior Mesenteric Artery Atherosclerosis  - Twisting abdominal pain, right sided twisting pain associated with nausea and vomiting and non-bloody diarrhea   - CT Abd and Pelvis with IV contrast: SMA stenosis  - Vascular recommendations: no evidence of mesenteric ischemia, can follow up o/p    # Gastritis  - CT Abd / Pelvis with IV contrast on admission: gastritis in the antrum  - GI following  - c/w PPI IV BID    # HLD  - start on atorvastatin 20 mg po     # Hyponatremia - resolved  -asymptomatic  - 0.9 % NS fluids     # COPD  - not on home O2  -  wheezing on the exam  - c/w home meds  - prednisone taper    # Fibromyalgia  - c/w oxycodone home medication    Diet: NPO for EGD today  Activity: as tolerated  GI PPX: IV PPI  DVT PPX: hold off due to history of brain aneurysm / sequentials      Handoff: EGD today

## 2019-11-25 NOTE — DISCHARGE NOTE PROVIDER - NSDCCPCAREPLAN_GEN_ALL_CORE_FT
PRINCIPAL DISCHARGE DIAGNOSIS  Diagnosis: Superior mesenteric artery stenosis  Assessment and Plan of Treatment: - You presented with abdominal pain and were found to have stenosis of an artery. The vascular surgery team was consulted and recommended outpatient follow up as there is no evidence of ischemia of the bowel.   - F/U with Vascular surgery Dr. Armendariz      SECONDARY DISCHARGE DIAGNOSES  Diagnosis: Gastritis  Assessment and Plan of Treatment: - You were found to have gastritis on the imaging and underwent EGD with Gastroenterology doctor on 11/25. PRINCIPAL DISCHARGE DIAGNOSIS  Diagnosis: Superior mesenteric artery stenosis  Assessment and Plan of Treatment: - You presented with abdominal pain and were found to have stenosis of an artery. The vascular surgery team was consulted and recommended outpatient follow up as there is no evidence of ischemia of the bowel.   - F/U with Vascular surgery Dr. Armendariz      SECONDARY DISCHARGE DIAGNOSES  Diagnosis: Gastritis  Assessment and Plan of Treatment: - You were found to have gastritis on the imaging and underwent EGD with Gastroenterology doctor on 11/25.  - pantoprazole 40 mg qd before breakfast

## 2019-11-25 NOTE — DISCHARGE NOTE PROVIDER - NSDCMRMEDTOKEN_GEN_ALL_CORE_FT
Breo Ellipta 200 mcg-25 mcg/inh inhalation powder: 1 puff(s) inhaled once a day  oxyCODONE 30 mg oral tablet: 1 tab(s) orally every 6 hours  pantoprazole 40 mg intravenous injection: 40 milligram(s) intravenous 2 times a day  Symbicort 160 mcg-4.5 mcg/inh inhalation aerosol: 2 puff(s) inhaled 2 times a day, As Needed.Last use 07/10/2019   Xanax 1 mg oral tablet: 1 tab(s) orally once a day atorvastatin 20 mg oral tablet: 1 tab(s) orally once a day (at bedtime)   Breo Ellipta 200 mcg-25 mcg/inh inhalation powder: 1 puff(s) inhaled once a day  cefdinir 300 mg oral capsule: 1 cap(s) orally 2 times a day   oxyCODONE 30 mg oral tablet: 1 tab(s) orally every 6 hours  pantoprazole 40 mg oral delayed release tablet: 1 tab(s) orally once a day before breakfast  Symbicort 160 mcg-4.5 mcg/inh inhalation aerosol: 2 puff(s) inhaled 2 times a day, As Needed.Last use 07/10/2019   Xanax 1 mg oral tablet: 1 tab(s) orally once a day

## 2019-11-25 NOTE — DISCHARGE NOTE PROVIDER - PROVIDER TOKENS
PROVIDER:[TOKEN:[[id number]]],PROVIDER:[TOKEN:[[id number]]] PROVIDER:[TOKEN:[[id number]]],PROVIDER:[TOKEN:[[id number]]],PROVIDER:[TOKEN:[[id number]]]

## 2019-11-25 NOTE — CHART NOTE - NSCHARTNOTEFT_GEN_A_CORE
59y Female    for Colonoscopy    HPI:  60 yo F with pmh of COPD (not on home O2), fibromyalgia, HLD (not on medication), GERD, and 2 brain aneurysms , presents with 1 month history of heartburn, nausea, vomiting (non bilious, non bloody), twisting abdominal pain (right sided, non-radiating) and diarrhea (non bloody). The patient notes that her abdominal pain is not associated with eating and she gets the pain intermittently throughout the day. The patient states that she has lost her appetite and is eating less than usual , however she denies having abdominal pain after eating. The patient also notes wheezing for the last few days.  The patient denies other review of systems except as noted above. (2019 00:13)      PAST MEDICAL & SURGICAL HISTORY:  H/O aneurysm: brain  SOB (shortness of breath)  Fibromyalgia  Spinal stenosis  Hypercholesteremia  COPD (chronic obstructive pulmonary disease)  S/P cerebral aneurysm repair: coil x 20 yrs ago        MEDICATIONS  (STANDING):  ALPRAZolam 1 milliGRAM(s) Oral daily  atorvastatin 20 milliGRAM(s) Oral at bedtime  budesonide 160 MICROgram(s)/formoterol 4.5 MICROgram(s) Inhaler 2 Puff(s) Inhalation two times a day  chlorhexidine 4% Liquid 1 Application(s) Topical <User Schedule>  pantoprazole  Injectable 40 milliGRAM(s) IV Push two times a day  sodium chloride 0.9%. 1000 milliLiter(s) (75 mL/Hr) IV Continuous <Continuous>    MEDICATIONS  (PRN):  acetaminophen   Tablet .. 650 milliGRAM(s) Oral every 6 hours PRN Moderate Pain (4 - 6)  oxyCODONE    IR 30 milliGRAM(s) Oral every 6 hours PRN Severe Pain (7 - 10)    Home Medications:  Breo Ellipta 200 mcg-25 mcg/inh inhalation powder: 1 puff(s) inhaled once a day (10 Jul 2019 17:53)  oxyCODONE 30 mg oral tablet: 1 tab(s) orally every 6 hours (10 Jul 2019 17:53)  pantoprazole 40 mg intravenous injection: 40 milligram(s) intravenous 2 times a day (2019 01:34)  Symbicort 160 mcg-4.5 mcg/inh inhalation aerosol: 2 puff(s) inhaled 2 times a day, As Needed.Last use 07/10/2019  (10 Jul 2019 17:53)  Xanax 1 mg oral tablet: 1 tab(s) orally once a day (10 Jul 2019 17:53)      Allergies    No Known Allergies    Intolerances        SOCIAL HISTORY: NSNDND    FAMILY HISTORY:  FH: pancreatic cancer  FHx: breast cancer  Family history of myocardial infarction in first degree male relative of known age: &gt; 61 yo  Family hx of lung cancer: father      Vital Signs Last 24 Hrs  T(C): 36.7 (2019 13:10), Max: 37.2 (2019 16:11)  T(F): 98.1 (2019 13:10), Max: 98.9 (2019 16:11)  HR: 71 (2019 13:10) (71 - 91)  BP: 171/73 (2019 13:10) (124/58 - 171/73)  BP(mean): --  RR: 16 (2019 13:10) (16 - 18)  SpO2: 96% (2019 06:45) (94% - 99%)    NPO: _x___ Yes  ____ No    Exam:  Drug Dosing Weight  Height (cm): 160 (2019 20:58)  Weight (kg): 54.7 (2019 20:58)  BMI (kg/m2): 21.4 (2019 20:58)  BSA (m2): 1.56 (2019 20:58)    MP: II  Teeth: intact with caps  Mouth opening:    LABS:                        10.8   6.95  )-----------( 299      ( 2019 05:25 )             33.2                         10.0   7.58  )-----------( 256      ( 2019 01:13 )             30.1                         11.4   9.22  )-----------( 292      ( 2019 11:25 )             33.6     CARDIAC MARKERS ( 2019 17:00 )  x     / <0.01 ng/mL / x     / x     / x              136  |  99  |  9<L>  ----------------------------<  114<H>  5.0   |  23  |  0.6<L>  11-23    127<L>  |  91<L>  |  7<L>  ----------------------------<  93  4.4   |  20  |  0.5<L>    Ca    8.9      2019 11:25  Ca    9.1      2019 17:00  Phos  3.5     11-  Mg     2.6     11-24  Mg     1.6     11-24  Mg     1.8     11-23    TPro  6.2  /  Alb  4.2  /  TBili  <0.2  /  DBili  x   /  AST  24  /  ALT  19  /  AlkPhos  59  11-24  TPro  6.9  /  Alb  4.5  /  TBili  <0.2  /  DBili  x   /  AST  35  /  ALT  25  /  AlkPhos  65  11-23    PT/INR - ( 2019 20:00 )   PT: 10.80 sec;   INR: 0.94 ratio         PTT - ( 2019 20:00 )  PTT:30.1 sec  Urinalysis Basic - ( 2019 16:43 )    Color: Colorless / Appearance: Clear / S.006 / pH: x  Gluc: x / Ketone: Negative  / Bili: Negative / Urobili: <2 mg/dL   Blood: x / Protein: Negative / Nitrite: Negative   Leuk Esterase: Moderate / RBC: 1 /HPF / WBC 16 /HPF   Sq Epi: x / Non Sq Epi: 8 /HPF / Bacteria: Negative        RADIOLOGY & ADDITIONAL STUDIES:      ASA __III__,  R/B/A discussed with: __x__ patient, ____ guardian, Understand and Accepts,  Question Answered.

## 2019-11-25 NOTE — DISCHARGE NOTE PROVIDER - CARE PROVIDER_API CALL
Sundeep Dumont)  Surgery; Vascular Surgery  [address] [place] [address], [address] [address]  Phone: [phone]  Fax: [phone]  Follow Up Time:     Pratibha Shultz)  Internal Medicine  [address] [place] [address]  Phone: [phone]  Fax: [phone]  Follow Up Time: Sundeep Dumont)  Surgery; Vascular Surgery  [address] [place] [address]  Phone: [phone]  Fax: [phone]  Follow Up Time:     Pratibha Shultz)  Internal Medicine  [address] [place] [address]  Phone: [phone]  Fax: [phone]  Follow Up Time:     Gary Christie)  Hematology; Internal Medicine; Medical Oncology  [address] [place] [address]  Phone: [phone]  Fax: [phone]  Follow Up Time:

## 2019-11-25 NOTE — DISCHARGE NOTE PROVIDER - HOSPITAL COURSE
60 yo F with pmh of COPD (not on home O2), fibromyalgia, HLD (not on medication), GERD, and 2 brain aneurysms , presents with 1 month history of heartburn, nausea, vomiting (non bilious, non bloody), twisting abdominal pain (right sided, non-radiating) and diarrhea (non bloody). The patient notes that her abdominal pain is not associated with eating and she gets the pain intermittently throughout the day. The patient states that she has lost her appetite and is eating less than usual , however she denies having abdominal pain after eating. The patient also notes wheezing for the last few days.  The patient denies other review of systems except as noted above. CT abdomen and pelvis with IV contrast was done and showed gastritis as well as SMA stenosis. Vascular was consulted and r/o mesenteric ischemia and suggested o/p follow up. GI was consulted for EGD and the patient underwent procedure on 11/25/2019. 58 yo F with pmh of COPD (not on home O2), fibromyalgia, HLD (not on medication), GERD, and 2 brain aneurysms , presents with 1 month history of heartburn, nausea, vomiting (non bilious, non bloody), twisting abdominal pain (right sided, non-radiating) and diarrhea (non bloody). The patient notes that her abdominal pain is not associated with eating and she gets the pain intermittently throughout the day. The patient states that she has lost her appetite and is eating less than usual , however she denies having abdominal pain after eating. The patient also notes wheezing for the last few days.  The patient denies other review of systems except as noted above. CT abdomen and pelvis with IV contrast was done and showed gastritis as well as SMA stenosis. Vascular was consulted and r/o mesenteric ischemia and suggested o/p follow up. GI was consulted for EGD and the patient underwent procedure on 11/25/2019. Patient to be discharged home with PPI QD and follow up outpatient.         Plan of care and med rec discussed with Dr. Christie on day of discharge 60 yo F with pmh of COPD (not on home O2), fibromyalgia, HLD (not on medication), GERD, and 2 brain aneurysms , presents with 1 month history of heartburn, nausea, vomiting (non bilious, non bloody), twisting abdominal pain (right sided, non-radiating) and diarrhea (non bloody). The patient notes that her abdominal pain is not associated with eating and she gets the pain intermittently throughout the day. The patient states that she has lost her appetite and is eating less than usual , however she denies having abdominal pain after eating. The patient also notes wheezing for the last few days.  The patient denies other review of systems except as noted above. CT abdomen and pelvis with IV contrast was done and showed gastritis as well as SMA stenosis. Vascular was consulted and r/o mesenteric ischemia and suggested o/p follow up. GI was consulted for EGD and the patient underwent procedure on 11/25/2019. Patient to be discharged home with PPI QD and follow up outpatient.         Plan of care and med rec discussed with Dr. Camilo on day of discharge

## 2019-11-25 NOTE — DISCHARGE NOTE PROVIDER - CARE PROVIDERS DIRECT ADDRESSES
,[email or web address] the [email or web address]

## 2019-11-26 ENCOUNTER — TRANSCRIPTION ENCOUNTER (OUTPATIENT)
Age: 59
End: 2019-11-26

## 2019-11-26 VITALS
RESPIRATION RATE: 20 BRPM | HEART RATE: 68 BPM | SYSTOLIC BLOOD PRESSURE: 136 MMHG | DIASTOLIC BLOOD PRESSURE: 64 MMHG | TEMPERATURE: 99 F

## 2019-11-26 DIAGNOSIS — J32.9 CHRONIC SINUSITIS, UNSPECIFIED: ICD-10-CM

## 2019-11-26 LAB
CULTURE RESULTS: SIGNIFICANT CHANGE UP
SPECIMEN SOURCE: SIGNIFICANT CHANGE UP
SURGICAL PATHOLOGY STUDY: SIGNIFICANT CHANGE UP

## 2019-11-26 RX ORDER — METOCLOPRAMIDE HCL 10 MG
1 TABLET ORAL
Qty: 40 | Refills: 0
Start: 2019-11-26 | End: 2019-12-05

## 2019-11-26 RX ORDER — CEFDINIR 250 MG/5ML
1 POWDER, FOR SUSPENSION ORAL
Qty: 20 | Refills: 0
Start: 2019-11-26 | End: 2019-12-05

## 2019-11-26 RX ORDER — METOCLOPRAMIDE HCL 10 MG
5 TABLET ORAL ONCE
Refills: 0 | Status: COMPLETED | OUTPATIENT
Start: 2019-11-26 | End: 2019-11-26

## 2019-11-26 RX ORDER — ONDANSETRON 8 MG/1
4 TABLET, FILM COATED ORAL ONCE
Refills: 0 | Status: COMPLETED | OUTPATIENT
Start: 2019-11-26 | End: 2019-11-26

## 2019-11-26 RX ADMIN — OXYCODONE HYDROCHLORIDE 30 MILLIGRAM(S): 5 TABLET ORAL at 15:18

## 2019-11-26 RX ADMIN — OXYCODONE HYDROCHLORIDE 30 MILLIGRAM(S): 5 TABLET ORAL at 06:00

## 2019-11-26 RX ADMIN — Medication 5 MILLIGRAM(S): at 12:30

## 2019-11-26 RX ADMIN — Medication 1 MILLIGRAM(S): at 11:02

## 2019-11-26 RX ADMIN — OXYCODONE HYDROCHLORIDE 30 MILLIGRAM(S): 5 TABLET ORAL at 06:30

## 2019-11-26 RX ADMIN — PANTOPRAZOLE SODIUM 40 MILLIGRAM(S): 20 TABLET, DELAYED RELEASE ORAL at 06:01

## 2019-11-26 RX ADMIN — OXYCODONE HYDROCHLORIDE 30 MILLIGRAM(S): 5 TABLET ORAL at 15:48

## 2019-11-26 RX ADMIN — ONDANSETRON 4 MILLIGRAM(S): 8 TABLET, FILM COATED ORAL at 05:57

## 2019-11-26 RX ADMIN — BUDESONIDE AND FORMOTEROL FUMARATE DIHYDRATE 2 PUFF(S): 160; 4.5 AEROSOL RESPIRATORY (INHALATION) at 08:02

## 2019-11-26 NOTE — PROGRESS NOTE ADULT - ASSESSMENT
ASSESSMENT AND PLAN:  60 yo F with pmh of COPD (not on home O2), fibromyalgia, HLD (not on medication), GERD, and 2 brain aneurysms , presents with 1 month history of heartburn, nausea, vomiting (non bilious, non bloody), twisting abdominal pain (right sided, non-radiating) and diarrhea (non bloody). The patient notes that her abdominal pain is not associated with eating and she gets the pain intermittently throughout the day. The patient states that she has lost her appetite and is eating less than usual , however she denies having abdominal pain after eating. The patient also notes wheezing for the last few days.  The patient denies other review of systems except as noted above.     Superior Mesenteric Artery Atherosclerosis  - CT Abd and Pelvis with IV contrast: SMA stenosis  - evaluated by Vascular Surgery finding no evidence of mesenteric ischemia  - follow up o/p    Gastritis , Duodenitis   - s/p EGD 11/25 with multiple biopsies   - GI f/u as an out patient  - continue Pantoprazole 40 mg po qd   - add Reglan for vomiting    Sinusitis, URI  - discharge on PO Cefdinir 300 mg BID for 10 day  - Flonase daily  - f/u with Dr Robert Mueller    HLD  - continue Atorvastatin 20 mg po     Hyponatremia   - resolved    COPD  - continue smoking smoking cessation  - continue home meds  - Prednisone taper    Fibromyalgia  - continue Oxycodone home medication    Diet: Resume as tolerated  Activity: as tolerated  GI PPX: PO PPI

## 2019-11-26 NOTE — PROGRESS NOTE ADULT - SUBJECTIVE AND OBJECTIVE BOX
HARI VÁSQUEZ  59y  Female  HPI:  60 yo F with pmh of COPD (not on home O2), fibromyalgia, HLD (not on medication), GERD, and 2 brain aneurysms , presents with 1 month history of heartburn, nausea, vomiting (non bilious, non bloody), twisting abdominal pain (right sided, non-radiating) and diarrhea (non bloody). The patient notes that her abdominal pain is not associated with eating and she gets the pain intermittently throughout the day. The patient states that she has lost her appetite and is eating less than usual , however she denies having abdominal pain after eating. The patient also notes wheezing for the last few days.  The patient denies other review of systems except as noted above. (24 Nov 2019 00:13)    MEDICATIONS  (STANDING):  ALPRAZolam 1 milliGRAM(s) Oral daily  atorvastatin 20 milliGRAM(s) Oral at bedtime  budesonide 160 MICROgram(s)/formoterol 4.5 MICROgram(s) Inhaler 2 Puff(s) Inhalation two times a day  chlorhexidine 4% Liquid 1 Application(s) Topical <User Schedule>  melatonin 5 milliGRAM(s) Oral at bedtime  metoclopramide 5 milliGRAM(s) Oral once  pantoprazole    Tablet 40 milliGRAM(s) Oral before breakfast    MEDICATIONS  (PRN):  acetaminophen   Tablet .. 650 milliGRAM(s) Oral every 6 hours PRN Moderate Pain (4 - 6)  oxyCODONE    IR 30 milliGRAM(s) Oral every 6 hours PRN Severe Pain (7 - 10)    INTERVAL EVENTS: Patient seen today with headache, vomited earlier?    T(C): 36.2 (11-26-19 @ 06:03), Max: 36.7 (11-25-19 @ 13:10)  HR: 65 (11-26-19 @ 06:03) (65 - 92)  BP: 140/63 (11-26-19 @ 06:03) (126/76 - 185/98)  RR: 17 (11-26-19 @ 06:03) (16 - 18)  SpO2: 99% (11-25-19 @ 15:15) (99% - 99%)  Wt(kg): --Vital Signs Last 24 Hrs  T(C): 36.2 (26 Nov 2019 06:03), Max: 36.7 (25 Nov 2019 13:10)  T(F): 97.2 (26 Nov 2019 06:03), Max: 98.1 (25 Nov 2019 13:10)  HR: 65 (26 Nov 2019 06:03) (65 - 92)  BP: 140/63 (26 Nov 2019 06:03) (126/76 - 185/98)  BP(mean): --  RR: 17 (26 Nov 2019 06:03) (16 - 18)  SpO2: 99% (25 Nov 2019 15:15) (99% - 99%)    PHYSICAL EXAM:  GENERAL: Chronically ill, + cough  NECK: Supple, No JVD  CHEST/LUNG: Occ wheezing  HEART: S1, S2, Regular rate and rhythm  ABDOMEN: Soft, Flat, Bowel sounds present  EXTREMITIES: No edema    LABS:                        10.8   6.95  )-----------( 299      ( 25 Nov 2019 05:25 )             33.2               Mg     2.6     11-24            PT/INR - ( 24 Nov 2019 20:00 )   PT: 10.80 sec;   INR: 0.94 ratio      PTT - ( 24 Nov 2019 20:00 )  PTT:30.1 sec        Culture - Urine (collected 24 Nov 2019 22:20)  Source: .Urine Clean Catch (Midstream)  Final Report (26 Nov 2019 07:31):    <10,000 CFU/mL Normal Urogenital Nikki      RADIOLOGY & ADDITIONAL TESTS:

## 2019-11-26 NOTE — DISCHARGE NOTE NURSING/CASE MANAGEMENT/SOCIAL WORK - PATIENT PORTAL LINK FT
You can access the FollowMyHealth Patient Portal offered by [facility] by registering at the following website: [email or web address]. By joining [employer]’s FollowMyHealth portal, you will also be able to view your health information using other applications (apps) compatible with our system.

## 2019-11-26 NOTE — PROGRESS NOTE ADULT - SUBJECTIVE AND OBJECTIVE BOX
SUBJECTIVE:    Patient is a 59y old Female who presents with a chief complaint of Abdominal Pain (25 Nov 2019 13:49)    Currently admitted to medicine with the primary diagnosis of Superior mesenteric artery stenosis     Today is hospital day 2d. This morning she is resting comfortably in bed and reports no new issues or overnight events.     ROS:   CONSTITUTIONAL: No weakness, fevers or chills   EYES/ENT: No visual changes; No vertigo or throat pain   NECK: No pain or stiffness   RESPIRATORY: No cough, wheezing, hemoptysis; No shortness of breath ; notes mild sinus pressure and tenderness  CARDIOVASCULAR: No chest pain or palpitations   GASTROINTESTINAL: No abdominal or epigastric pain. No nausea, vomiting, or hematemesis; No diarrhea or constipation. No melena or hematochezia.  GENITOURINARY: No dysuria, frequency or hematuria  NEUROLOGICAL: No numbness or weakness  SKIN: No itching, rashes      PAST MEDICAL & SURGICAL HISTORY  H/O aneurysm: brain  SOB (shortness of breath)  Fibromyalgia  Spinal stenosis  Hypercholesteremia  COPD (chronic obstructive pulmonary disease)  S/P cerebral aneurysm repair: coil x 20 yrs ago    SOCIAL HISTORY:  smoker   ALLERGIES:  No Known Allergies    MEDICATIONS:  STANDING MEDICATIONS  ALPRAZolam 1 milliGRAM(s) Oral daily  atorvastatin 20 milliGRAM(s) Oral at bedtime  budesonide 160 MICROgram(s)/formoterol 4.5 MICROgram(s) Inhaler 2 Puff(s) Inhalation two times a day  chlorhexidine 4% Liquid 1 Application(s) Topical <User Schedule>  melatonin 5 milliGRAM(s) Oral at bedtime  pantoprazole    Tablet 40 milliGRAM(s) Oral before breakfast    PRN MEDICATIONS  acetaminophen   Tablet .. 650 milliGRAM(s) Oral every 6 hours PRN  oxyCODONE    IR 30 milliGRAM(s) Oral every 6 hours PRN    VITALS:   T(F): 97.2  HR: 65  BP: 140/63  RR: 17  SpO2: 99%    LABS:                          10.8   6.95  )-----------( 299      ( 25 Nov 2019 05:25 )             33.2     11-24    136  |  99  |  9<L>  ----------------------------<  114<H>  5.0   |  23  |  0.6<L>    Ca    8.9      24 Nov 2019 11:25  Mg     2.6     11-24    TPro  6.2  /  Alb  4.2  /  TBili  <0.2  /  DBili  x   /  AST  24  /  ALT  19  /  AlkPhos  59  11-24    PT/INR - ( 24 Nov 2019 20:00 )   PT: 10.80 sec;   INR: 0.94 ratio         PTT - ( 24 Nov 2019 20:00 )  PTT:30.1 sec    Culture - Urine (collected 24 Nov 2019 22:20)  Source: .Urine Clean Catch (Midstream)  Final Report (26 Nov 2019 07:31):    <10,000 CFU/mL Normal Urogenital Nikki    RADIOLOGY:  < from: EGD (11.25.19 @ 14:15) >  Impressions:    Normal mucosa in the whole esophagus.    Erythema in the stomach body and fundus compatible with non-erosive gastritis.  (Biopsy).    Erythema in the duodenum compatible with duodenitis. (Biopsy).    Area of erythema, edema found on the antrum s/p 4 biopsy to R/O malignancy. .     < end of copied text >    PHYSICAL EXAM:  GEN: No acute distress  HEENT: normocephalic, atraumatic, aniceteric  LUNGS/RESPIRATORY: Clear to auscultation bilaterally, + wheezing b/l, + maxillary sinus tenderness   HEART: S1/S2 present. RRR, no murmurs  ABD: Soft, non-tender, non-distended. Bowel sounds present  EXT: NC/NC/NE/2+PP/RUIZ  NEURO: AAOX3, normal affect      ASSESSMENT AND PLAN:  58 yo F with pmh of COPD (not on home O2), fibromyalgia, HLD (not on medication), GERD, and 2 brain aneurysms , presents with 1 month history of heartburn, nausea, vomiting (non bilious, non bloody), twisting abdominal pain (right sided, non-radiating) and diarrhea (non bloody). The patient notes that her abdominal pain is not associated with eating and she gets the pain intermittently throughout the day. The patient states that she has lost her appetite and is eating less than usual , however she denies having abdominal pain after eating. The patient also notes wheezing for the last few days.  The patient denies other review of systems except as noted above.     # Superior Mesenteric Artery Atherosclerosis  - Twisting abdominal pain, right sided twisting pain associated with nausea and vomiting and non-bloody diarrhea   - CT Abd and Pelvis with IV contrast: SMA stenosis  - Vascular recommendations: no evidence of mesenteric ischemia, can follow up o/p    # Gastritis , Duodenitis   s/p EGD 11/25 with biopsies   - CT Abd / Pelvis with IV contrast on admission: gastritis in the antrum  - GI following  - Pantoprazole 40 mg po qd before breakfast    # HLD  - c/w  atorvastatin 20 mg po     # Hyponatremia - resolved    # COPD  - not on home O2  -  wheezing on the exam  - c/w home meds  - prednisone taper    # Fibromyalgia  - c/w oxycodone home medication    Diet: NPO for EGD today  Activity: as tolerated  GI PPX: IV PPI  DVT PPX: hold off due to history of brain aneurysm / sequentials      Handoff: d/c home today , f/u o/p with vascular and GI SUBJECTIVE:    Patient is a 59y old Female who presents with a chief complaint of Abdominal Pain (25 Nov 2019 13:49)    Currently admitted to medicine with the primary diagnosis of Superior mesenteric artery stenosis     Today is hospital day 2d. This morning she is resting comfortably in bed and reports no new issues or overnight events.     ROS:   CONSTITUTIONAL: No weakness, fevers or chills   EYES/ENT: No visual changes; No vertigo or throat pain   NECK: No pain or stiffness   RESPIRATORY: No cough, wheezing, hemoptysis; No shortness of breath ; notes mild sinus pressure and tenderness  CARDIOVASCULAR: No chest pain or palpitations   GASTROINTESTINAL: No abdominal or epigastric pain. No nausea, vomiting, or hematemesis; No diarrhea or constipation. No melena or hematochezia.  GENITOURINARY: No dysuria, frequency or hematuria  NEUROLOGICAL: No numbness or weakness  SKIN: No itching, rashes      PAST MEDICAL & SURGICAL HISTORY  H/O aneurysm: brain  SOB (shortness of breath)  Fibromyalgia  Spinal stenosis  Hypercholesteremia  COPD (chronic obstructive pulmonary disease)  S/P cerebral aneurysm repair: coil x 20 yrs ago    SOCIAL HISTORY:  smoker   ALLERGIES:  No Known Allergies    MEDICATIONS:  STANDING MEDICATIONS  ALPRAZolam 1 milliGRAM(s) Oral daily  atorvastatin 20 milliGRAM(s) Oral at bedtime  budesonide 160 MICROgram(s)/formoterol 4.5 MICROgram(s) Inhaler 2 Puff(s) Inhalation two times a day  chlorhexidine 4% Liquid 1 Application(s) Topical <User Schedule>  melatonin 5 milliGRAM(s) Oral at bedtime  pantoprazole    Tablet 40 milliGRAM(s) Oral before breakfast    PRN MEDICATIONS  acetaminophen   Tablet .. 650 milliGRAM(s) Oral every 6 hours PRN  oxyCODONE    IR 30 milliGRAM(s) Oral every 6 hours PRN    VITALS:   T(F): 97.2  HR: 65  BP: 140/63  RR: 17  SpO2: 99%    LABS:                          10.8   6.95  )-----------( 299      ( 25 Nov 2019 05:25 )             33.2     11-24    136  |  99  |  9<L>  ----------------------------<  114<H>  5.0   |  23  |  0.6<L>    Ca    8.9      24 Nov 2019 11:25  Mg     2.6     11-24    TPro  6.2  /  Alb  4.2  /  TBili  <0.2  /  DBili  x   /  AST  24  /  ALT  19  /  AlkPhos  59  11-24    PT/INR - ( 24 Nov 2019 20:00 )   PT: 10.80 sec;   INR: 0.94 ratio         PTT - ( 24 Nov 2019 20:00 )  PTT:30.1 sec    Culture - Urine (collected 24 Nov 2019 22:20)  Source: .Urine Clean Catch (Midstream)  Final Report (26 Nov 2019 07:31):    <10,000 CFU/mL Normal Urogenital Nikki    RADIOLOGY:  < from: EGD (11.25.19 @ 14:15) >  Impressions:    Normal mucosa in the whole esophagus.    Erythema in the stomach body and fundus compatible with non-erosive gastritis.  (Biopsy).    Erythema in the duodenum compatible with duodenitis. (Biopsy).    Area of erythema, edema found on the antrum s/p 4 biopsy to R/O malignancy. .     < end of copied text >    PHYSICAL EXAM:  GEN: No acute distress  HEENT: normocephalic, atraumatic, aniceteric  LUNGS/RESPIRATORY: Clear to auscultation bilaterally, + wheezing b/l, + maxillary sinus tenderness   HEART: S1/S2 present. RRR, no murmurs  ABD: Soft, non-tender, non-distended. Bowel sounds present  EXT: NC/NC/NE/2+PP/RUIZ  NEURO: AAOX3, normal affect      ASSESSMENT AND PLAN:  60 yo F with pmh of COPD (not on home O2), fibromyalgia, HLD (not on medication), GERD, and 2 brain aneurysms , presents with 1 month history of heartburn, nausea, vomiting (non bilious, non bloody), twisting abdominal pain (right sided, non-radiating) and diarrhea (non bloody). The patient notes that her abdominal pain is not associated with eating and she gets the pain intermittently throughout the day. The patient states that she has lost her appetite and is eating less than usual , however she denies having abdominal pain after eating. The patient also notes wheezing for the last few days.  The patient denies other review of systems except as noted above.     # Superior Mesenteric Artery Atherosclerosis  - Twisting abdominal pain, right sided twisting pain associated with nausea and vomiting and non-bloody diarrhea   - CT Abd and Pelvis with IV contrast: SMA stenosis  - Vascular recommendations: no evidence of mesenteric ischemia, can follow up o/p    # Gastritis , Duodenitis   s/p EGD 11/25 with biopsies   - CT Abd / Pelvis with IV contrast on admission: gastritis in the antrum  - GI following  - Pantoprazole 40 mg po qd before breakfast    # Sinusitis   - start PO Antibiotics , Z-pack  - Flonase   - f/u pcp o/p    # HLD  - c/w  atorvastatin 20 mg po     # Hyponatremia - resolved    # COPD  - not on home O2  -  wheezing on the exam  - c/w home meds  - prednisone taper    # Fibromyalgia  - c/w oxycodone home medication    Diet: NPO for EGD today  Activity: as tolerated  GI PPX: IV PPI  DVT PPX: hold off due to history of brain aneurysm / sequentials      Handoff: d/c home today , f/u o/p with vascular and GI SUBJECTIVE:    Patient is a 59y old Female who presents with a chief complaint of Abdominal Pain (25 Nov 2019 13:49)    Currently admitted to medicine with the primary diagnosis of Superior mesenteric artery stenosis     Today is hospital day 2d. This morning she is resting comfortably in bed and reports no new issues or overnight events.     ROS:   CONSTITUTIONAL: No weakness, fevers or chills   EYES/ENT: No visual changes; No vertigo or throat pain   NECK: No pain or stiffness   RESPIRATORY: No cough, wheezing, hemoptysis; No shortness of breath ; notes mild sinus pressure and tenderness  CARDIOVASCULAR: No chest pain or palpitations   GASTROINTESTINAL: No abdominal or epigastric pain. No nausea, vomiting, or hematemesis; No diarrhea or constipation. No melena or hematochezia.  GENITOURINARY: No dysuria, frequency or hematuria  NEUROLOGICAL: No numbness or weakness  SKIN: No itching, rashes      PAST MEDICAL & SURGICAL HISTORY  H/O aneurysm: brain  SOB (shortness of breath)  Fibromyalgia  Spinal stenosis  Hypercholesteremia  COPD (chronic obstructive pulmonary disease)  S/P cerebral aneurysm repair: coil x 20 yrs ago    SOCIAL HISTORY:  smoker   ALLERGIES:  No Known Allergies    MEDICATIONS:  STANDING MEDICATIONS  ALPRAZolam 1 milliGRAM(s) Oral daily  atorvastatin 20 milliGRAM(s) Oral at bedtime  budesonide 160 MICROgram(s)/formoterol 4.5 MICROgram(s) Inhaler 2 Puff(s) Inhalation two times a day  chlorhexidine 4% Liquid 1 Application(s) Topical <User Schedule>  melatonin 5 milliGRAM(s) Oral at bedtime  pantoprazole    Tablet 40 milliGRAM(s) Oral before breakfast    PRN MEDICATIONS  acetaminophen   Tablet .. 650 milliGRAM(s) Oral every 6 hours PRN  oxyCODONE    IR 30 milliGRAM(s) Oral every 6 hours PRN    VITALS:   T(F): 97.2  HR: 65  BP: 140/63  RR: 17  SpO2: 99%    LABS:                          10.8   6.95  )-----------( 299      ( 25 Nov 2019 05:25 )             33.2     11-24    136  |  99  |  9<L>  ----------------------------<  114<H>  5.0   |  23  |  0.6<L>    Ca    8.9      24 Nov 2019 11:25  Mg     2.6     11-24    TPro  6.2  /  Alb  4.2  /  TBili  <0.2  /  DBili  x   /  AST  24  /  ALT  19  /  AlkPhos  59  11-24    PT/INR - ( 24 Nov 2019 20:00 )   PT: 10.80 sec;   INR: 0.94 ratio         PTT - ( 24 Nov 2019 20:00 )  PTT:30.1 sec    Culture - Urine (collected 24 Nov 2019 22:20)  Source: .Urine Clean Catch (Midstream)  Final Report (26 Nov 2019 07:31):    <10,000 CFU/mL Normal Urogenital Nikki    RADIOLOGY:  < from: EGD (11.25.19 @ 14:15) >  Impressions:    Normal mucosa in the whole esophagus.    Erythema in the stomach body and fundus compatible with non-erosive gastritis.  (Biopsy).    Erythema in the duodenum compatible with duodenitis. (Biopsy).    Area of erythema, edema found on the antrum s/p 4 biopsy to R/O malignancy. .     < end of copied text >    PHYSICAL EXAM:  GEN: No acute distress  HEENT: normocephalic, atraumatic, aniceteric  LUNGS/RESPIRATORY: Clear to auscultation bilaterally, + wheezing b/l, + maxillary sinus tenderness   HEART: S1/S2 present. RRR, no murmurs  ABD: Soft, non-tender, non-distended. Bowel sounds present  EXT: NC/NC/NE/2+PP/RUIZ  NEURO: AAOX3, normal affect      ASSESSMENT AND PLAN:  60 yo F with pmh of COPD (not on home O2), fibromyalgia, HLD (not on medication), GERD, and 2 brain aneurysms , presents with 1 month history of heartburn, nausea, vomiting (non bilious, non bloody), twisting abdominal pain (right sided, non-radiating) and diarrhea (non bloody). The patient notes that her abdominal pain is not associated with eating and she gets the pain intermittently throughout the day. The patient states that she has lost her appetite and is eating less than usual , however she denies having abdominal pain after eating. The patient also notes wheezing for the last few days.  The patient denies other review of systems except as noted above.     # Superior Mesenteric Artery Atherosclerosis  - Twisting abdominal pain, right sided twisting pain associated with nausea and vomiting and non-bloody diarrhea   - CT Abd and Pelvis with IV contrast: SMA stenosis  - Vascular recommendations: no evidence of mesenteric ischemia, can follow up o/p    # Gastritis , Duodenitis   s/p EGD 11/25 with biopsies   - CT Abd / Pelvis with IV contrast on admission: gastritis in the antrum  - GI following  - Pantoprazole 40 mg po qd before breakfast    # Sinusitis   - start PO Augmentin  - Flonase   - f/u pcp o/p    # HLD  - c/w  atorvastatin 20 mg po     # Hyponatremia - resolved    # COPD  - not on home O2  -  wheezing on the exam  - c/w home meds  - prednisone taper    # Fibromyalgia  - c/w oxycodone home medication    Diet: NPO for EGD today  Activity: as tolerated  GI PPX: IV PPI  DVT PPX: hold off due to history of brain aneurysm / sequentials      Handoff: d/c home today , f/u o/p with vascular and GI SUBJECTIVE:    Patient is a 59y old Female who presents with a chief complaint of Abdominal Pain (25 Nov 2019 13:49)    Currently admitted to medicine with the primary diagnosis of Superior mesenteric artery stenosis     Today is hospital day 2d. This morning she is resting comfortably in bed and reports no new issues or overnight events.     ROS:   CONSTITUTIONAL: No weakness, fevers or chills   EYES/ENT: No visual changes; No vertigo or throat pain   NECK: No pain or stiffness   RESPIRATORY: No cough, wheezing, hemoptysis; No shortness of breath ; notes mild sinus pressure and tenderness  CARDIOVASCULAR: No chest pain or palpitations   GASTROINTESTINAL: No abdominal or epigastric pain. No nausea, vomiting, or hematemesis; No diarrhea or constipation. No melena or hematochezia.  GENITOURINARY: No dysuria, frequency or hematuria  NEUROLOGICAL: No numbness or weakness  SKIN: No itching, rashes      PAST MEDICAL & SURGICAL HISTORY  H/O aneurysm: brain  SOB (shortness of breath)  Fibromyalgia  Spinal stenosis  Hypercholesteremia  COPD (chronic obstructive pulmonary disease)  S/P cerebral aneurysm repair: coil x 20 yrs ago    SOCIAL HISTORY:  smoker   ALLERGIES:  No Known Allergies    MEDICATIONS:  STANDING MEDICATIONS  ALPRAZolam 1 milliGRAM(s) Oral daily  atorvastatin 20 milliGRAM(s) Oral at bedtime  budesonide 160 MICROgram(s)/formoterol 4.5 MICROgram(s) Inhaler 2 Puff(s) Inhalation two times a day  chlorhexidine 4% Liquid 1 Application(s) Topical <User Schedule>  melatonin 5 milliGRAM(s) Oral at bedtime  pantoprazole    Tablet 40 milliGRAM(s) Oral before breakfast    PRN MEDICATIONS  acetaminophen   Tablet .. 650 milliGRAM(s) Oral every 6 hours PRN  oxyCODONE    IR 30 milliGRAM(s) Oral every 6 hours PRN    VITALS:   T(F): 97.2  HR: 65  BP: 140/63  RR: 17  SpO2: 99%    LABS:                          10.8   6.95  )-----------( 299      ( 25 Nov 2019 05:25 )             33.2     11-24    136  |  99  |  9<L>  ----------------------------<  114<H>  5.0   |  23  |  0.6<L>    Ca    8.9      24 Nov 2019 11:25  Mg     2.6     11-24    TPro  6.2  /  Alb  4.2  /  TBili  <0.2  /  DBili  x   /  AST  24  /  ALT  19  /  AlkPhos  59  11-24    PT/INR - ( 24 Nov 2019 20:00 )   PT: 10.80 sec;   INR: 0.94 ratio         PTT - ( 24 Nov 2019 20:00 )  PTT:30.1 sec    Culture - Urine (collected 24 Nov 2019 22:20)  Source: .Urine Clean Catch (Midstream)  Final Report (26 Nov 2019 07:31):    <10,000 CFU/mL Normal Urogenital Nikki    RADIOLOGY:  < from: EGD (11.25.19 @ 14:15) >  Impressions:    Normal mucosa in the whole esophagus.    Erythema in the stomach body and fundus compatible with non-erosive gastritis.  (Biopsy).    Erythema in the duodenum compatible with duodenitis. (Biopsy).    Area of erythema, edema found on the antrum s/p 4 biopsy to R/O malignancy. .     < end of copied text >    PHYSICAL EXAM:  GEN: No acute distress  HEENT: normocephalic, atraumatic, aniceteric  LUNGS/RESPIRATORY: Clear to auscultation bilaterally, + wheezing b/l, + maxillary sinus tenderness   HEART: S1/S2 present. RRR, no murmurs  ABD: Soft, non-tender, non-distended. Bowel sounds present  EXT: NC/NC/NE/2+PP/RUIZ  NEURO: AAOX3, normal affect      ASSESSMENT AND PLAN:  60 yo F with pmh of COPD (not on home O2), fibromyalgia, HLD (not on medication), GERD, and 2 brain aneurysms , presents with 1 month history of heartburn, nausea, vomiting (non bilious, non bloody), twisting abdominal pain (right sided, non-radiating) and diarrhea (non bloody). The patient notes that her abdominal pain is not associated with eating and she gets the pain intermittently throughout the day. The patient states that she has lost her appetite and is eating less than usual , however she denies having abdominal pain after eating. The patient also notes wheezing for the last few days.  The patient denies other review of systems except as noted above.     # Superior Mesenteric Artery Atherosclerosis  - Twisting abdominal pain, right sided twisting pain associated with nausea and vomiting and non-bloody diarrhea   - CT Abd and Pelvis with IV contrast: SMA stenosis  - Vascular recommendations: no evidence of mesenteric ischemia, can follow up o/p    # Gastritis , Duodenitis   s/p EGD 11/25 with biopsies   - CT Abd / Pelvis with IV contrast on admission: gastritis in the antrum  - GI following  - Pantoprazole 40 mg po qd before breakfast    # Sinusitis   - start PO Augmentin  - Flonase   - f/u pcp o/p    # HLD  - c/w  atorvastatin 20 mg po     # Hyponatremia - resolved    # COPD  - not on home O2  -  wheezing on the exam  - c/w home meds      # Fibromyalgia  - c/w oxycodone home medication    Diet: NPO for EGD today  Activity: as tolerated  GI PPX: IV PPI  DVT PPX: hold off due to history of brain aneurysm / sequentials      Handoff: d/c home today , f/u o/p with vascular and GI

## 2019-11-27 ENCOUNTER — INBOUND DOCUMENT (OUTPATIENT)
Age: 59
End: 2019-11-27

## 2019-12-01 ENCOUNTER — OUTPATIENT (OUTPATIENT)
Dept: OUTPATIENT SERVICES | Facility: HOSPITAL | Age: 59
LOS: 1 days | End: 2019-12-01
Payer: MEDICAID

## 2019-12-01 DIAGNOSIS — Z98.890 OTHER SPECIFIED POSTPROCEDURAL STATES: Chronic | ICD-10-CM

## 2019-12-01 PROCEDURE — G9001: CPT

## 2019-12-02 DIAGNOSIS — I10 ESSENTIAL (PRIMARY) HYPERTENSION: ICD-10-CM

## 2019-12-02 DIAGNOSIS — J32.9 CHRONIC SINUSITIS, UNSPECIFIED: ICD-10-CM

## 2019-12-02 DIAGNOSIS — M79.7 FIBROMYALGIA: ICD-10-CM

## 2019-12-02 DIAGNOSIS — R11.2 NAUSEA WITH VOMITING, UNSPECIFIED: ICD-10-CM

## 2019-12-02 DIAGNOSIS — K29.80 DUODENITIS WITHOUT BLEEDING: ICD-10-CM

## 2019-12-02 DIAGNOSIS — F17.210 NICOTINE DEPENDENCE, CIGARETTES, UNCOMPLICATED: ICD-10-CM

## 2019-12-02 DIAGNOSIS — K55.1 CHRONIC VASCULAR DISORDERS OF INTESTINE: ICD-10-CM

## 2019-12-02 DIAGNOSIS — K21.9 GASTRO-ESOPHAGEAL REFLUX DISEASE WITHOUT ESOPHAGITIS: ICD-10-CM

## 2019-12-02 DIAGNOSIS — K29.70 GASTRITIS, UNSPECIFIED, WITHOUT BLEEDING: ICD-10-CM

## 2019-12-02 DIAGNOSIS — E78.5 HYPERLIPIDEMIA, UNSPECIFIED: ICD-10-CM

## 2019-12-02 DIAGNOSIS — J44.1 CHRONIC OBSTRUCTIVE PULMONARY DISEASE WITH (ACUTE) EXACERBATION: ICD-10-CM

## 2019-12-02 DIAGNOSIS — E87.1 HYPO-OSMOLALITY AND HYPONATREMIA: ICD-10-CM

## 2019-12-10 DIAGNOSIS — Z71.89 OTHER SPECIFIED COUNSELING: ICD-10-CM

## 2019-12-12 ENCOUNTER — EMERGENCY (EMERGENCY)
Facility: HOSPITAL | Age: 59
LOS: 0 days | Discharge: HOME | End: 2019-12-12
Admitting: EMERGENCY MEDICINE
Payer: MEDICAID

## 2019-12-12 ENCOUNTER — APPOINTMENT (OUTPATIENT)
Dept: VASCULAR SURGERY | Facility: CLINIC | Age: 59
End: 2019-12-12
Payer: MEDICAID

## 2019-12-12 VITALS
DIASTOLIC BLOOD PRESSURE: 73 MMHG | TEMPERATURE: 97 F | HEART RATE: 89 BPM | OXYGEN SATURATION: 100 % | RESPIRATION RATE: 18 BRPM | SYSTOLIC BLOOD PRESSURE: 160 MMHG

## 2019-12-12 VITALS
DIASTOLIC BLOOD PRESSURE: 80 MMHG | HEIGHT: 63 IN | SYSTOLIC BLOOD PRESSURE: 140 MMHG | BODY MASS INDEX: 18.25 KG/M2 | WEIGHT: 103 LBS

## 2019-12-12 DIAGNOSIS — F32.9 ANXIETY DISORDER, UNSPECIFIED: ICD-10-CM

## 2019-12-12 DIAGNOSIS — M79.7 FIBROMYALGIA: ICD-10-CM

## 2019-12-12 DIAGNOSIS — F41.9 ANXIETY DISORDER, UNSPECIFIED: ICD-10-CM

## 2019-12-12 DIAGNOSIS — M48.00 SPINAL STENOSIS, SITE UNSPECIFIED: ICD-10-CM

## 2019-12-12 DIAGNOSIS — T15.90XA FOREIGN BODY ON EXTERNAL EYE, PART UNSPECIFIED, UNSPECIFIED EYE, INITIAL ENCOUNTER: ICD-10-CM
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
## 2019-12-12 DIAGNOSIS — J44.9 CHRONIC OBSTRUCTIVE PULMONARY DISEASE, UNSPECIFIED: ICD-10-CM

## 2019-12-12 DIAGNOSIS — S05.01XA INJURY OF CONJUNCTIVA AND CORNEAL ABRASION WITHOUT FOREIGN BODY, RIGHT EYE, INITIAL ENCOUNTER: ICD-10-CM

## 2019-12-12 DIAGNOSIS — Z86.79 PERSONAL HISTORY OF OTHER DISEASES OF THE CIRCULATORY SYSTEM: ICD-10-CM

## 2019-12-12 DIAGNOSIS — G43.909 MIGRAINE, UNSPECIFIED, NOT INTRACTABLE, W/OUT STATUS MIGRAINOSUS: ICD-10-CM

## 2019-12-12 DIAGNOSIS — Z98.890 OTHER SPECIFIED POSTPROCEDURAL STATES: Chronic | ICD-10-CM

## 2019-12-12 DIAGNOSIS — M19.90 UNSPECIFIED OSTEOARTHRITIS, UNSPECIFIED SITE: ICD-10-CM
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
## 2019-12-12 DIAGNOSIS — M54.30 SCIATICA, UNSPECIFIED SIDE: ICD-10-CM
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
## 2019-12-12 DIAGNOSIS — Z82.49 FAMILY HISTORY OF ISCHEMIC HEART DISEASE AND OTHER DISEASES OF THE CIRCULATORY SYSTEM: ICD-10-CM

## 2019-12-12 PROCEDURE — 99214 OFFICE O/P EST MOD 30 MIN: CPT

## 2019-12-12 PROCEDURE — 99283 EMERGENCY DEPT VISIT LOW MDM: CPT

## 2019-12-12 RX ORDER — ALPRAZOLAM 1 MG/1
1 TABLET ORAL
Refills: 0 | Status: ACTIVE | COMMUNITY

## 2019-12-12 RX ORDER — ATORVASTATIN CALCIUM 20 MG/1
20 TABLET, FILM COATED ORAL
Refills: 0 | Status: ACTIVE | COMMUNITY

## 2019-12-12 RX ORDER — BUDESONIDE AND FORMOTEROL FUMARATE DIHYDRATE 160; 4.5 UG/1; UG/1
160-4.5 AEROSOL RESPIRATORY (INHALATION)
Refills: 0 | Status: ACTIVE | COMMUNITY

## 2019-12-12 RX ORDER — OFLOXACIN 0.3 %
1 DROPS OPHTHALMIC (EYE) ONCE
Refills: 0 | Status: COMPLETED | OUTPATIENT
Start: 2019-12-12 | End: 2019-12-12

## 2019-12-12 RX ORDER — PANTOPRAZOLE SODIUM 40 MG/1
40 GRANULE, DELAYED RELEASE ORAL
Refills: 0 | Status: ACTIVE | COMMUNITY

## 2019-12-12 RX ORDER — OXYCODONE HYDROCHLORIDE 30 MG/1
30 TABLET ORAL
Refills: 0 | Status: ACTIVE | COMMUNITY

## 2019-12-12 RX ORDER — FLUTICASONE FUROATE AND VILANTEROL TRIFENATATE 200; 25 UG/1; UG/1
200-25 POWDER RESPIRATORY (INHALATION)
Refills: 0 | Status: ACTIVE | COMMUNITY

## 2019-12-12 NOTE — ASSESSMENT
[FreeTextEntry1] : 60 y/o female with h/o smoking (1 pack per day), COPD, was in ED for abdominal pain, had a CT scan that showed SMA stenosis, and gastritis, was discharged on Protonix, reports postprandial abdominal pain followed by nausea and vomiting, weight loss of 7 pounds in 2 weeks time. She c/o knee pain on ambulation. She tries to eat small meals currently.\par I reviewed the CT scan that showed SMA occlusion and I have discussed the results with her. She will require a mesenteric bypass for treatment. She will require medical and cardiac clearance prior to surgery. She is scheduled for mesenteric bypass on 1/14/2020.\par \par

## 2019-12-12 NOTE — ED PROVIDER NOTE - EYES, MLM
Clear bilaterally, pupils equal, round and reactive to light.  EOMI.  Visual Acuity OU 20/25  OD 20/25  OS 20/25 (with correction)  Fluorescein (+) uptake centrally Right

## 2019-12-12 NOTE — ED ADULT NURSE NOTE - PMH
COPD (chronic obstructive pulmonary disease)    Fibromyalgia    H/O aneurysm  brain  Hypercholesteremia    SOB (shortness of breath)    Spinal stenosis

## 2019-12-12 NOTE — ED PROVIDER NOTE - PATIENT PORTAL LINK FT
You can access the FollowMyHealth Patient Portal offered by [facility] by registering at the following website: [email or web address]. By joining [employer]’s FollowMyHealth portal, you will also be able to view your health information using other applications (apps) compatible with our system.
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
## 2019-12-12 NOTE — ED CLERICAL - NS ED CLERK NOTE PRE-ARRIVAL INFORMATION; ADDITIONAL PRE-ARRIVAL INFORMATION
This patient is eligible for (or currently enrolled in) an outpatient care management program available through [email or web address]. This program can coordinate outpatient follow up and assist the patient in accessing a variety of outpatient resources.  If discharged from the ED, the patient will be contacted to see if any additional resources are needed.                                                                                    Please call the Nurse Clinical Call Center at [phone] with any questions or for assistance in discharge planning.

## 2019-12-12 NOTE — HISTORY OF PRESENT ILLNESS
[FreeTextEntry1] : 58 y/o female with h/o smoking, was in ED for abdominal pain, had a CT scan that showed SMA stenosis, and gastritis, was discharged on Protonix, reports postprandial abdominal pain followed by nausea and vomiting, weight loss of 7 pounds in 2 weeks time. She c/o knee pain on ambulation. SHe tries to eat small meals

## 2019-12-12 NOTE — ED PROVIDER NOTE - NSFOLLOWUPCLINICS_GEN_ALL_ED_FT
[facility]  Ophthalmolgy  [address], [address]  [place] [address]  Phone: [phone]  Fax:   Follow Up Time: 1-3 Days

## 2019-12-12 NOTE — ED PROVIDER NOTE - NSFOLLOWUPINSTRUCTIONS_ED_ALL_ED_FT
****Ofloxacin 1 drop to Right eye every 6 hours for 7 days.****        Corneal Abrasion     A corneal abrasion is a scratch or injury to the clear covering over the front of your eye (cornea). This can be painful. It is important to get treatment for a corneal abrasion. If this problem is not treated, it can affect your eyesight (vision).  Follow these instructions at home:  Medicines     Use eye drops or ointments as told by your doctor.If you were prescribed antibiotic drops or ointment, use them as told by your doctor. Do not stop using the antibiotic even if you start to feel better.Take over-the-counter and prescription medicines only as told by your doctor.Do not drive or use heavy machinery while taking prescription pain medicine.General instructions     If you have an eye patch, wear it as told by your doctor.  Do not drive or use machinery while wearing an eye patch.Follow instructions from your doctor about when to take off the patch.Ask your doctor if you can use a cold, wet cloth (compress) on your eye to help with pain.Do not rub or touch your eye. Do not wash out your eye.Do not wear contact lenses until your doctor says that this is okay.Avoid bright light.Avoid straining your eyes.Keep all follow-up visits as told by your doctor. Doing this can help to prevent infection and loss of eyesight.Contact a doctor if:  You continue to have eye pain and other symptoms for more than 2 days.You get new symptoms, such as:  Redness.Watery eyes (tearing).Discharge.You have discharge that makes your eyelids stick together in the morning.Symptoms come back after your eye heals.Get help right away if:  You have very bad eye pain that does not get better with medicine.You lose eyesight.Summary  A corneal abrasion is a scratch or injury to the clear covering over the front of your eye (cornea).It is important to get treatment for a corneal abrasion. If this problem is not treated, it can affect your eyesight (vision).Use eye drops or ointments as told by your doctor.If you have an eye patch, do not drive or use machinery while wearing it.This information is not intended to replace advice given to you by your health care provider. Make sure you discuss any questions you have with your health care provider.    Document Released: 06/05/2009 Document Revised: 12/02/2017 Document Reviewed: 12/02/2017  Elsevier Interactive Patient Education © 2019 [employer]

## 2019-12-12 NOTE — ED PROVIDER NOTE - CLINICAL SUMMARY MEDICAL DECISION MAKING FREE TEXT BOX
ofloxacin; tetanus up to date; opthalmology referral; pt will follow up with PCP in 2-3 days; any new or worsening symptoms, pt will return to ER.

## 2020-01-07 ENCOUNTER — OUTPATIENT (OUTPATIENT)
Dept: OUTPATIENT SERVICES | Facility: HOSPITAL | Age: 60
LOS: 1 days | Discharge: HOME | End: 2020-01-07
Payer: MEDICAID

## 2020-01-07 VITALS
HEIGHT: 61 IN | SYSTOLIC BLOOD PRESSURE: 138 MMHG | DIASTOLIC BLOOD PRESSURE: 65 MMHG | WEIGHT: 110.23 LBS | TEMPERATURE: 98 F | RESPIRATION RATE: 19 BRPM | OXYGEN SATURATION: 99 % | HEART RATE: 81 BPM

## 2020-01-07 DIAGNOSIS — I77.1 STRICTURE OF ARTERY: ICD-10-CM

## 2020-01-07 DIAGNOSIS — Z98.890 OTHER SPECIFIED POSTPROCEDURAL STATES: Chronic | ICD-10-CM

## 2020-01-07 DIAGNOSIS — Z01.818 ENCOUNTER FOR OTHER PREPROCEDURAL EXAMINATION: ICD-10-CM

## 2020-01-07 LAB
ALBUMIN SERPL ELPH-MCNC: 5 G/DL — SIGNIFICANT CHANGE UP (ref 3.5–5.2)
ALP SERPL-CCNC: 80 U/L — SIGNIFICANT CHANGE UP (ref 30–115)
ALT FLD-CCNC: 33 U/L — SIGNIFICANT CHANGE UP (ref 0–41)
ANION GAP SERPL CALC-SCNC: 18 MMOL/L — HIGH (ref 7–14)
APTT BLD: 34.9 SEC — SIGNIFICANT CHANGE UP (ref 27–39.2)
AST SERPL-CCNC: 54 U/L — HIGH (ref 0–41)
BASOPHILS # BLD AUTO: 0.05 K/UL — SIGNIFICANT CHANGE UP (ref 0–0.2)
BASOPHILS NFR BLD AUTO: 0.8 % — SIGNIFICANT CHANGE UP (ref 0–1)
BILIRUB SERPL-MCNC: 0.5 MG/DL — SIGNIFICANT CHANGE UP (ref 0.2–1.2)
BLD GP AB SCN SERPL QL: SIGNIFICANT CHANGE UP
BUN SERPL-MCNC: 11 MG/DL — SIGNIFICANT CHANGE UP (ref 10–20)
CALCIUM SERPL-MCNC: 10 MG/DL — SIGNIFICANT CHANGE UP (ref 8.5–10.1)
CHLORIDE SERPL-SCNC: 96 MMOL/L — LOW (ref 98–110)
CO2 SERPL-SCNC: 26 MMOL/L — SIGNIFICANT CHANGE UP (ref 17–32)
CREAT SERPL-MCNC: 0.7 MG/DL — SIGNIFICANT CHANGE UP (ref 0.7–1.5)
EOSINOPHIL # BLD AUTO: 0.14 K/UL — SIGNIFICANT CHANGE UP (ref 0–0.7)
EOSINOPHIL NFR BLD AUTO: 2.4 % — SIGNIFICANT CHANGE UP (ref 0–8)
GLUCOSE SERPL-MCNC: 101 MG/DL — HIGH (ref 70–99)
HCT VFR BLD CALC: 39.7 % — SIGNIFICANT CHANGE UP (ref 37–47)
HGB BLD-MCNC: 13.5 G/DL — SIGNIFICANT CHANGE UP (ref 12–16)
IMM GRANULOCYTES NFR BLD AUTO: 0.2 % — SIGNIFICANT CHANGE UP (ref 0.1–0.3)
INR BLD: 0.91 RATIO — SIGNIFICANT CHANGE UP (ref 0.65–1.3)
LYMPHOCYTES # BLD AUTO: 1.67 K/UL — SIGNIFICANT CHANGE UP (ref 1.2–3.4)
LYMPHOCYTES # BLD AUTO: 28.1 % — SIGNIFICANT CHANGE UP (ref 20.5–51.1)
MCHC RBC-ENTMCNC: 31.6 PG — HIGH (ref 27–31)
MCHC RBC-ENTMCNC: 34 G/DL — SIGNIFICANT CHANGE UP (ref 32–37)
MCV RBC AUTO: 93 FL — SIGNIFICANT CHANGE UP (ref 81–99)
MONOCYTES # BLD AUTO: 0.52 K/UL — SIGNIFICANT CHANGE UP (ref 0.1–0.6)
MONOCYTES NFR BLD AUTO: 8.8 % — SIGNIFICANT CHANGE UP (ref 1.7–9.3)
NEUTROPHILS # BLD AUTO: 3.55 K/UL — SIGNIFICANT CHANGE UP (ref 1.4–6.5)
NEUTROPHILS NFR BLD AUTO: 59.7 % — SIGNIFICANT CHANGE UP (ref 42.2–75.2)
NRBC # BLD: 0 /100 WBCS — SIGNIFICANT CHANGE UP (ref 0–0)
PLATELET # BLD AUTO: 251 K/UL — SIGNIFICANT CHANGE UP (ref 130–400)
POTASSIUM SERPL-MCNC: 4.3 MMOL/L — SIGNIFICANT CHANGE UP (ref 3.5–5)
POTASSIUM SERPL-SCNC: 4.3 MMOL/L — SIGNIFICANT CHANGE UP (ref 3.5–5)
PROT SERPL-MCNC: 7.5 G/DL — SIGNIFICANT CHANGE UP (ref 6–8)
PROTHROM AB SERPL-ACNC: 10.5 SEC — SIGNIFICANT CHANGE UP (ref 9.95–12.87)
RBC # BLD: 4.27 M/UL — SIGNIFICANT CHANGE UP (ref 4.2–5.4)
RBC # FLD: 13.2 % — SIGNIFICANT CHANGE UP (ref 11.5–14.5)
SODIUM SERPL-SCNC: 140 MMOL/L — SIGNIFICANT CHANGE UP (ref 135–146)
WBC # BLD: 5.94 K/UL — SIGNIFICANT CHANGE UP (ref 4.8–10.8)
WBC # FLD AUTO: 5.94 K/UL — SIGNIFICANT CHANGE UP (ref 4.8–10.8)

## 2020-01-07 PROCEDURE — 93010 ELECTROCARDIOGRAM REPORT: CPT

## 2020-01-07 NOTE — H&P PST ADULT - HISTORY OF PRESENT ILLNESS
59 year old female here for above, pt was recently diagnosed with 'blockage' of artery in abdomen, pt has been having pain in abdomen on and off for approx 18 months, needs procedure  pt denies cp, or palpitations. + sob +vale  pt denies urinary frequency, urgency or burning  ex nehal 1 fos 59 year old female here for above, pt was recently diagnosed with 'blockage' of artery in abdomen, pt had been having pain in abdomen on and off for approx 18 months, needs procedure  pt denies cp, or palpitations. + sob +vale  pt denies urinary frequency, urgency or burning  ex nehal 1 fos secondary to vale

## 2020-01-07 NOTE — H&P PST ADULT - NSICDXFAMILYHX_GEN_ALL_CORE_FT
FAMILY HISTORY:  Family history of myocardial infarction in first degree male relative of known age, > 61 yo  Family hx of lung cancer, father  FH: pancreatic cancer, uncle  FHx: breast cancer, grandmother, aunt

## 2020-01-07 NOTE — H&P PST ADULT - GASTROINTESTINAL DETAILS
normal/nontender/no rigidity/no organomegaly/bowel sounds normal/no masses palpable/no rebound tenderness/no bruit/soft/no guarding

## 2020-01-07 NOTE — H&P PST ADULT - PRO PAIN LIFE ADAPT
none decreased activity level/inability to sleep/inability or reluctance to perform ADLs/inability to enjoy life

## 2020-01-21 ENCOUNTER — INPATIENT (INPATIENT)
Facility: HOSPITAL | Age: 60
LOS: 6 days | Discharge: HOME | End: 2020-01-28
Attending: SURGERY | Admitting: SURGERY
Payer: MEDICAID

## 2020-01-21 ENCOUNTER — APPOINTMENT (OUTPATIENT)
Dept: VASCULAR SURGERY | Facility: HOSPITAL | Age: 60
End: 2020-01-21

## 2020-01-21 VITALS
WEIGHT: 108.91 LBS | HEIGHT: 61 IN | HEART RATE: 87 BPM | TEMPERATURE: 98 F | DIASTOLIC BLOOD PRESSURE: 66 MMHG | RESPIRATION RATE: 18 BRPM | SYSTOLIC BLOOD PRESSURE: 136 MMHG

## 2020-01-21 DIAGNOSIS — Z98.890 OTHER SPECIFIED POSTPROCEDURAL STATES: Chronic | ICD-10-CM

## 2020-01-21 LAB
ANION GAP SERPL CALC-SCNC: 9 MMOL/L — SIGNIFICANT CHANGE UP (ref 7–14)
BLD GP AB SCN SERPL QL: SIGNIFICANT CHANGE UP
BUN SERPL-MCNC: 14 MG/DL — SIGNIFICANT CHANGE UP (ref 10–20)
CALCIUM SERPL-MCNC: 8.2 MG/DL — LOW (ref 8.5–10.1)
CHLORIDE SERPL-SCNC: 102 MMOL/L — SIGNIFICANT CHANGE UP (ref 98–110)
CK MB CFR SERPL CALC: 11.1 NG/ML — HIGH (ref 0.6–6.3)
CK SERPL-CCNC: 674 U/L — HIGH (ref 0–225)
CO2 SERPL-SCNC: 20 MMOL/L — SIGNIFICANT CHANGE UP (ref 17–32)
CREAT SERPL-MCNC: 1.5 MG/DL — SIGNIFICANT CHANGE UP (ref 0.7–1.5)
GLUCOSE BLDC GLUCOMTR-MCNC: 159 MG/DL — HIGH (ref 70–99)
GLUCOSE SERPL-MCNC: 166 MG/DL — HIGH (ref 70–99)
HCT VFR BLD CALC: 31.9 % — LOW (ref 37–47)
HGB BLD-MCNC: 10.6 G/DL — LOW (ref 12–16)
MAGNESIUM SERPL-MCNC: 1.5 MG/DL — LOW (ref 1.8–2.4)
MCHC RBC-ENTMCNC: 31.6 PG — HIGH (ref 27–31)
MCHC RBC-ENTMCNC: 33.2 G/DL — SIGNIFICANT CHANGE UP (ref 32–37)
MCV RBC AUTO: 95.2 FL — SIGNIFICANT CHANGE UP (ref 81–99)
NRBC # BLD: 0 /100 WBCS — SIGNIFICANT CHANGE UP (ref 0–0)
PHOSPHATE SERPL-MCNC: 4.6 MG/DL — SIGNIFICANT CHANGE UP (ref 2.1–4.9)
PLATELET # BLD AUTO: 259 K/UL — SIGNIFICANT CHANGE UP (ref 130–400)
POTASSIUM SERPL-MCNC: 4.8 MMOL/L — SIGNIFICANT CHANGE UP (ref 3.5–5)
POTASSIUM SERPL-SCNC: 4.8 MMOL/L — SIGNIFICANT CHANGE UP (ref 3.5–5)
RBC # BLD: 3.35 M/UL — LOW (ref 4.2–5.4)
RBC # FLD: 14.1 % — SIGNIFICANT CHANGE UP (ref 11.5–14.5)
SODIUM SERPL-SCNC: 131 MMOL/L — LOW (ref 135–146)
TROPONIN T SERPL-MCNC: <0.01 NG/ML — SIGNIFICANT CHANGE UP
WBC # BLD: 16.03 K/UL — HIGH (ref 4.8–10.8)
WBC # FLD AUTO: 16.03 K/UL — HIGH (ref 4.8–10.8)

## 2020-01-21 PROCEDURE — 93010 ELECTROCARDIOGRAM REPORT: CPT

## 2020-01-21 PROCEDURE — 35631 BPG AOR-CELIAC-MSN-RENAL: CPT

## 2020-01-21 PROCEDURE — 71045 X-RAY EXAM CHEST 1 VIEW: CPT | Mod: 26

## 2020-01-21 RX ORDER — BUDESONIDE AND FORMOTEROL FUMARATE DIHYDRATE 160; 4.5 UG/1; UG/1
2 AEROSOL RESPIRATORY (INHALATION)
Refills: 0 | Status: DISCONTINUED | OUTPATIENT
Start: 2020-01-21 | End: 2020-01-28

## 2020-01-21 RX ORDER — SODIUM CHLORIDE 9 MG/ML
1000 INJECTION INTRAMUSCULAR; INTRAVENOUS; SUBCUTANEOUS
Refills: 0 | Status: DISCONTINUED | OUTPATIENT
Start: 2020-01-21 | End: 2020-01-22

## 2020-01-21 RX ORDER — ONDANSETRON 8 MG/1
4 TABLET, FILM COATED ORAL ONCE
Refills: 0 | Status: COMPLETED | OUTPATIENT
Start: 2020-01-21 | End: 2020-01-21

## 2020-01-21 RX ORDER — UMECLIDINIUM BROMIDE AND VILANTEROL TRIFENATATE 62.5; 25 UG/1; UG/1
1 POWDER RESPIRATORY (INHALATION)
Qty: 0 | Refills: 0 | DISCHARGE

## 2020-01-21 RX ORDER — HYDROMORPHONE HYDROCHLORIDE 2 MG/ML
0.5 INJECTION INTRAMUSCULAR; INTRAVENOUS; SUBCUTANEOUS EVERY 4 HOURS
Refills: 0 | Status: DISCONTINUED | OUTPATIENT
Start: 2020-01-21 | End: 2020-01-21

## 2020-01-21 RX ORDER — HEPARIN SODIUM 5000 [USP'U]/ML
5000 INJECTION INTRAVENOUS; SUBCUTANEOUS EVERY 8 HOURS
Refills: 0 | Status: DISCONTINUED | OUTPATIENT
Start: 2020-01-22 | End: 2020-01-28

## 2020-01-21 RX ORDER — HYDROMORPHONE HYDROCHLORIDE 2 MG/ML
0.5 INJECTION INTRAMUSCULAR; INTRAVENOUS; SUBCUTANEOUS EVERY 4 HOURS
Refills: 0 | Status: DISCONTINUED | OUTPATIENT
Start: 2020-01-21 | End: 2020-01-22

## 2020-01-21 RX ORDER — PANTOPRAZOLE SODIUM 20 MG/1
40 TABLET, DELAYED RELEASE ORAL DAILY
Refills: 0 | Status: DISCONTINUED | OUTPATIENT
Start: 2020-01-21 | End: 2020-01-28

## 2020-01-21 RX ORDER — ACETAMINOPHEN 500 MG
1000 TABLET ORAL ONCE
Refills: 0 | Status: COMPLETED | OUTPATIENT
Start: 2020-01-22 | End: 2020-01-22

## 2020-01-21 RX ORDER — ACETAMINOPHEN 500 MG
1000 TABLET ORAL EVERY 8 HOURS
Refills: 0 | Status: COMPLETED | OUTPATIENT
Start: 2020-01-21 | End: 2020-01-21

## 2020-01-21 RX ORDER — TIOTROPIUM BROMIDE 18 UG/1
1 CAPSULE ORAL; RESPIRATORY (INHALATION) DAILY
Refills: 0 | Status: DISCONTINUED | OUTPATIENT
Start: 2020-01-21 | End: 2020-01-28

## 2020-01-21 RX ORDER — CEFAZOLIN SODIUM 1 G
2000 VIAL (EA) INJECTION EVERY 8 HOURS
Refills: 0 | Status: DISCONTINUED | OUTPATIENT
Start: 2020-01-21 | End: 2020-01-21

## 2020-01-21 RX ORDER — CHLORHEXIDINE GLUCONATE 213 G/1000ML
1 SOLUTION TOPICAL
Refills: 0 | Status: DISCONTINUED | OUTPATIENT
Start: 2020-01-21 | End: 2020-01-28

## 2020-01-21 RX ORDER — MAGNESIUM SULFATE 500 MG/ML
2 VIAL (ML) INJECTION ONCE
Refills: 0 | Status: COMPLETED | OUTPATIENT
Start: 2020-01-21 | End: 2020-01-21

## 2020-01-21 RX ORDER — HYDROMORPHONE HYDROCHLORIDE 2 MG/ML
0.5 INJECTION INTRAMUSCULAR; INTRAVENOUS; SUBCUTANEOUS
Refills: 0 | Status: DISCONTINUED | OUTPATIENT
Start: 2020-01-21 | End: 2020-01-21

## 2020-01-21 RX ORDER — FLUTICASONE FUROATE AND VILANTEROL TRIFENATATE 100; 25 UG/1; UG/1
1 POWDER RESPIRATORY (INHALATION)
Qty: 0 | Refills: 0 | DISCHARGE

## 2020-01-21 RX ORDER — OXYCODONE HYDROCHLORIDE 5 MG/1
1 TABLET ORAL
Qty: 0 | Refills: 0 | DISCHARGE

## 2020-01-21 RX ORDER — BUDESONIDE AND FORMOTEROL FUMARATE DIHYDRATE 160; 4.5 UG/1; UG/1
2 AEROSOL RESPIRATORY (INHALATION)
Qty: 0 | Refills: 0 | DISCHARGE

## 2020-01-21 RX ORDER — CEFAZOLIN SODIUM 1 G
2000 VIAL (EA) INJECTION EVERY 8 HOURS
Refills: 0 | Status: COMPLETED | OUTPATIENT
Start: 2020-01-21 | End: 2020-01-22

## 2020-01-21 RX ORDER — MORPHINE SULFATE 50 MG/1
4 CAPSULE, EXTENDED RELEASE ORAL EVERY 4 HOURS
Refills: 0 | Status: DISCONTINUED | OUTPATIENT
Start: 2020-01-21 | End: 2020-01-21

## 2020-01-21 RX ORDER — ALPRAZOLAM 0.25 MG
1 TABLET ORAL
Qty: 0 | Refills: 0 | DISCHARGE

## 2020-01-21 RX ORDER — SODIUM CHLORIDE 9 MG/ML
1000 INJECTION, SOLUTION INTRAVENOUS
Refills: 0 | Status: DISCONTINUED | OUTPATIENT
Start: 2020-01-21 | End: 2020-01-21

## 2020-01-21 RX ORDER — MORPHINE SULFATE 50 MG/1
2 CAPSULE, EXTENDED RELEASE ORAL
Refills: 0 | Status: DISCONTINUED | OUTPATIENT
Start: 2020-01-21 | End: 2020-01-21

## 2020-01-21 RX ADMIN — ONDANSETRON 4 MILLIGRAM(S): 8 TABLET, FILM COATED ORAL at 19:15

## 2020-01-21 RX ADMIN — Medication 100 MILLIGRAM(S): at 21:36

## 2020-01-21 RX ADMIN — SODIUM CHLORIDE 100 MILLILITER(S): 9 INJECTION, SOLUTION INTRAVENOUS at 21:20

## 2020-01-21 RX ADMIN — SODIUM CHLORIDE 90 MILLILITER(S): 9 INJECTION INTRAMUSCULAR; INTRAVENOUS; SUBCUTANEOUS at 22:01

## 2020-01-21 RX ADMIN — HYDROMORPHONE HYDROCHLORIDE 0.5 MILLIGRAM(S): 2 INJECTION INTRAMUSCULAR; INTRAVENOUS; SUBCUTANEOUS at 19:45

## 2020-01-21 RX ADMIN — Medication 400 MILLIGRAM(S): at 23:11

## 2020-01-21 RX ADMIN — BUDESONIDE AND FORMOTEROL FUMARATE DIHYDRATE 2 PUFF(S): 160; 4.5 AEROSOL RESPIRATORY (INHALATION) at 21:36

## 2020-01-21 RX ADMIN — Medication 25 GRAM(S): at 22:02

## 2020-01-21 RX ADMIN — HYDROMORPHONE HYDROCHLORIDE 0.5 MILLIGRAM(S): 2 INJECTION INTRAMUSCULAR; INTRAVENOUS; SUBCUTANEOUS at 19:29

## 2020-01-21 NOTE — CHART NOTE - NSCHARTNOTEFT_GEN_A_CORE
Anesthesia Post Op Assessment  		(    ) Intubated           TV _SV____	Rate __15___	FiO2_NC 3L____  		(  x  ) Patent airway. Full return of protective reflexes  		(  x  )Full recovery from anesthesia/sedation to baseline status      Cardiovascular Function:  	  BP:  128/60		             Pulse: 91		             RR: 15		             Temp: 98.5		             O2Sat: 98%      Mental Status:  	        ( x   ) awake		  (    ) alert		 (    ) drowsy	               (    ) sedated      Nausea/Vomiting:  		(    ) Yes, See post-op orders		   (  x  ) No      Pain Scale: (0-10):			Treatment:     (    ) None	            ( x   ) See Post-Op/PCA Orders      Post-operative Fluids: 	   (    ) Oral	          (  x  ) See post-op Orders        Comments:    Uneventful. No complications from anesthesia.  Discharge to ICU when criteria met.

## 2020-01-22 LAB
ANION GAP SERPL CALC-SCNC: 11 MMOL/L — SIGNIFICANT CHANGE UP (ref 7–14)
ANION GAP SERPL CALC-SCNC: 12 MMOL/L — SIGNIFICANT CHANGE UP (ref 7–14)
ANION GAP SERPL CALC-SCNC: 12 MMOL/L — SIGNIFICANT CHANGE UP (ref 7–14)
APTT BLD: 27.6 SEC — SIGNIFICANT CHANGE UP (ref 27–39.2)
APTT BLD: 28.4 SEC — SIGNIFICANT CHANGE UP (ref 27–39.2)
BUN SERPL-MCNC: 16 MG/DL — SIGNIFICANT CHANGE UP (ref 10–20)
BUN SERPL-MCNC: 17 MG/DL — SIGNIFICANT CHANGE UP (ref 10–20)
BUN SERPL-MCNC: 17 MG/DL — SIGNIFICANT CHANGE UP (ref 10–20)
CALCIUM SERPL-MCNC: 7.4 MG/DL — LOW (ref 8.5–10.1)
CALCIUM SERPL-MCNC: 7.6 MG/DL — LOW (ref 8.5–10.1)
CALCIUM SERPL-MCNC: 7.8 MG/DL — LOW (ref 8.5–10.1)
CHLORIDE SERPL-SCNC: 104 MMOL/L — SIGNIFICANT CHANGE UP (ref 98–110)
CHLORIDE SERPL-SCNC: 104 MMOL/L — SIGNIFICANT CHANGE UP (ref 98–110)
CHLORIDE SERPL-SCNC: 107 MMOL/L — SIGNIFICANT CHANGE UP (ref 98–110)
CK MB CFR SERPL CALC: 18.3 NG/ML — HIGH (ref 0.6–6.3)
CK MB CFR SERPL CALC: 19.1 NG/ML — HIGH (ref 0.6–6.3)
CK SERPL-CCNC: 1015 U/L — HIGH (ref 0–225)
CK SERPL-CCNC: 1096 U/L — HIGH (ref 0–225)
CK SERPL-CCNC: 1099 U/L — HIGH (ref 0–225)
CO2 SERPL-SCNC: 19 MMOL/L — SIGNIFICANT CHANGE UP (ref 17–32)
CO2 SERPL-SCNC: 21 MMOL/L — SIGNIFICANT CHANGE UP (ref 17–32)
CO2 SERPL-SCNC: 21 MMOL/L — SIGNIFICANT CHANGE UP (ref 17–32)
CREAT SERPL-MCNC: 1.3 MG/DL — SIGNIFICANT CHANGE UP (ref 0.7–1.5)
CREAT SERPL-MCNC: 1.6 MG/DL — HIGH (ref 0.7–1.5)
CREAT SERPL-MCNC: 1.6 MG/DL — HIGH (ref 0.7–1.5)
GLUCOSE BLDC GLUCOMTR-MCNC: 106 MG/DL — HIGH (ref 70–99)
GLUCOSE BLDC GLUCOMTR-MCNC: 111 MG/DL — HIGH (ref 70–99)
GLUCOSE BLDC GLUCOMTR-MCNC: 113 MG/DL — HIGH (ref 70–99)
GLUCOSE BLDC GLUCOMTR-MCNC: 116 MG/DL — HIGH (ref 70–99)
GLUCOSE BLDC GLUCOMTR-MCNC: 116 MG/DL — HIGH (ref 70–99)
GLUCOSE BLDC GLUCOMTR-MCNC: 128 MG/DL — HIGH (ref 70–99)
GLUCOSE BLDC GLUCOMTR-MCNC: 146 MG/DL — HIGH (ref 70–99)
GLUCOSE SERPL-MCNC: 107 MG/DL — HIGH (ref 70–99)
GLUCOSE SERPL-MCNC: 112 MG/DL — HIGH (ref 70–99)
GLUCOSE SERPL-MCNC: 151 MG/DL — HIGH (ref 70–99)
HCT VFR BLD CALC: 24.6 % — LOW (ref 37–47)
HCT VFR BLD CALC: 24.7 % — LOW (ref 37–47)
HCT VFR BLD CALC: 28.9 % — LOW (ref 37–47)
HGB BLD-MCNC: 8.1 G/DL — LOW (ref 12–16)
HGB BLD-MCNC: 8.2 G/DL — LOW (ref 12–16)
HGB BLD-MCNC: 9.7 G/DL — LOW (ref 12–16)
INR BLD: 0.96 RATIO — SIGNIFICANT CHANGE UP (ref 0.65–1.3)
INR BLD: 1.01 RATIO — SIGNIFICANT CHANGE UP (ref 0.65–1.3)
MAGNESIUM SERPL-MCNC: 2.2 MG/DL — SIGNIFICANT CHANGE UP (ref 1.8–2.4)
MAGNESIUM SERPL-MCNC: 2.3 MG/DL — SIGNIFICANT CHANGE UP (ref 1.8–2.4)
MCHC RBC-ENTMCNC: 31.5 PG — HIGH (ref 27–31)
MCHC RBC-ENTMCNC: 32.3 PG — HIGH (ref 27–31)
MCHC RBC-ENTMCNC: 32.4 PG — HIGH (ref 27–31)
MCHC RBC-ENTMCNC: 32.8 G/DL — SIGNIFICANT CHANGE UP (ref 32–37)
MCHC RBC-ENTMCNC: 33.3 G/DL — SIGNIFICANT CHANGE UP (ref 32–37)
MCHC RBC-ENTMCNC: 33.6 G/DL — SIGNIFICANT CHANGE UP (ref 32–37)
MCV RBC AUTO: 96.1 FL — SIGNIFICANT CHANGE UP (ref 81–99)
MCV RBC AUTO: 96.3 FL — SIGNIFICANT CHANGE UP (ref 81–99)
MCV RBC AUTO: 97.2 FL — SIGNIFICANT CHANGE UP (ref 81–99)
NRBC # BLD: 0 /100 WBCS — SIGNIFICANT CHANGE UP (ref 0–0)
PHOSPHATE SERPL-MCNC: 4.1 MG/DL — SIGNIFICANT CHANGE UP (ref 2.1–4.9)
PHOSPHATE SERPL-MCNC: 5.5 MG/DL — HIGH (ref 2.1–4.9)
PLATELET # BLD AUTO: 181 K/UL — SIGNIFICANT CHANGE UP (ref 130–400)
PLATELET # BLD AUTO: 182 K/UL — SIGNIFICANT CHANGE UP (ref 130–400)
PLATELET # BLD AUTO: 222 K/UL — SIGNIFICANT CHANGE UP (ref 130–400)
POTASSIUM SERPL-MCNC: 4.2 MMOL/L — SIGNIFICANT CHANGE UP (ref 3.5–5)
POTASSIUM SERPL-MCNC: 4.2 MMOL/L — SIGNIFICANT CHANGE UP (ref 3.5–5)
POTASSIUM SERPL-MCNC: 4.7 MMOL/L — SIGNIFICANT CHANGE UP (ref 3.5–5)
POTASSIUM SERPL-SCNC: 4.2 MMOL/L — SIGNIFICANT CHANGE UP (ref 3.5–5)
POTASSIUM SERPL-SCNC: 4.2 MMOL/L — SIGNIFICANT CHANGE UP (ref 3.5–5)
POTASSIUM SERPL-SCNC: 4.7 MMOL/L — SIGNIFICANT CHANGE UP (ref 3.5–5)
PROTHROM AB SERPL-ACNC: 11.1 SEC — SIGNIFICANT CHANGE UP (ref 9.95–12.87)
PROTHROM AB SERPL-ACNC: 11.6 SEC — SIGNIFICANT CHANGE UP (ref 9.95–12.87)
RBC # BLD: 2.53 M/UL — LOW (ref 4.2–5.4)
RBC # BLD: 2.57 M/UL — LOW (ref 4.2–5.4)
RBC # BLD: 3 M/UL — LOW (ref 4.2–5.4)
RBC # FLD: 14 % — SIGNIFICANT CHANGE UP (ref 11.5–14.5)
RBC # FLD: 14.2 % — SIGNIFICANT CHANGE UP (ref 11.5–14.5)
RBC # FLD: 14.3 % — SIGNIFICANT CHANGE UP (ref 11.5–14.5)
SODIUM SERPL-SCNC: 136 MMOL/L — SIGNIFICANT CHANGE UP (ref 135–146)
SODIUM SERPL-SCNC: 137 MMOL/L — SIGNIFICANT CHANGE UP (ref 135–146)
SODIUM SERPL-SCNC: 138 MMOL/L — SIGNIFICANT CHANGE UP (ref 135–146)
TROPONIN T SERPL-MCNC: <0.01 NG/ML — SIGNIFICANT CHANGE UP
TROPONIN T SERPL-MCNC: <0.01 NG/ML — SIGNIFICANT CHANGE UP
WBC # BLD: 11.32 K/UL — HIGH (ref 4.8–10.8)
WBC # BLD: 12.2 K/UL — HIGH (ref 4.8–10.8)
WBC # BLD: 9.61 K/UL — SIGNIFICANT CHANGE UP (ref 4.8–10.8)
WBC # FLD AUTO: 11.32 K/UL — HIGH (ref 4.8–10.8)
WBC # FLD AUTO: 12.2 K/UL — HIGH (ref 4.8–10.8)
WBC # FLD AUTO: 9.61 K/UL — SIGNIFICANT CHANGE UP (ref 4.8–10.8)

## 2020-01-22 PROCEDURE — 71045 X-RAY EXAM CHEST 1 VIEW: CPT | Mod: 26

## 2020-01-22 PROCEDURE — 99291 CRITICAL CARE FIRST HOUR: CPT

## 2020-01-22 PROCEDURE — 99024 POSTOP FOLLOW-UP VISIT: CPT

## 2020-01-22 RX ORDER — LABETALOL HCL 100 MG
5 TABLET ORAL ONCE
Refills: 0 | Status: COMPLETED | OUTPATIENT
Start: 2020-01-22 | End: 2020-01-22

## 2020-01-22 RX ORDER — OXYCODONE HYDROCHLORIDE 5 MG/1
10 TABLET ORAL EVERY 6 HOURS
Refills: 0 | Status: DISCONTINUED | OUTPATIENT
Start: 2020-01-22 | End: 2020-01-22

## 2020-01-22 RX ORDER — SENNA PLUS 8.6 MG/1
2 TABLET ORAL AT BEDTIME
Refills: 0 | Status: DISCONTINUED | OUTPATIENT
Start: 2020-01-22 | End: 2020-01-28

## 2020-01-22 RX ORDER — OXYCODONE HYDROCHLORIDE 5 MG/1
10 TABLET ORAL EVERY 4 HOURS
Refills: 0 | Status: DISCONTINUED | OUTPATIENT
Start: 2020-01-22 | End: 2020-01-24

## 2020-01-22 RX ORDER — SODIUM CHLORIDE 9 MG/ML
1000 INJECTION, SOLUTION INTRAVENOUS
Refills: 0 | Status: DISCONTINUED | OUTPATIENT
Start: 2020-01-22 | End: 2020-01-23

## 2020-01-22 RX ORDER — OXYCODONE HYDROCHLORIDE 5 MG/1
5 TABLET ORAL EVERY 4 HOURS
Refills: 0 | Status: DISCONTINUED | OUTPATIENT
Start: 2020-01-22 | End: 2020-01-28

## 2020-01-22 RX ORDER — OXYCODONE HYDROCHLORIDE 5 MG/1
5 TABLET ORAL EVERY 6 HOURS
Refills: 0 | Status: DISCONTINUED | OUTPATIENT
Start: 2020-01-22 | End: 2020-01-22

## 2020-01-22 RX ADMIN — Medication 400 MILLIGRAM(S): at 06:47

## 2020-01-22 RX ADMIN — HYDROMORPHONE HYDROCHLORIDE 0.5 MILLIGRAM(S): 2 INJECTION INTRAMUSCULAR; INTRAVENOUS; SUBCUTANEOUS at 14:46

## 2020-01-22 RX ADMIN — HYDROMORPHONE HYDROCHLORIDE 0.5 MILLIGRAM(S): 2 INJECTION INTRAMUSCULAR; INTRAVENOUS; SUBCUTANEOUS at 21:39

## 2020-01-22 RX ADMIN — HYDROMORPHONE HYDROCHLORIDE 0.5 MILLIGRAM(S): 2 INJECTION INTRAMUSCULAR; INTRAVENOUS; SUBCUTANEOUS at 17:34

## 2020-01-22 RX ADMIN — Medication 100 MILLIGRAM(S): at 14:16

## 2020-01-22 RX ADMIN — BUDESONIDE AND FORMOTEROL FUMARATE DIHYDRATE 2 PUFF(S): 160; 4.5 AEROSOL RESPIRATORY (INHALATION) at 10:04

## 2020-01-22 RX ADMIN — HYDROMORPHONE HYDROCHLORIDE 0.5 MILLIGRAM(S): 2 INJECTION INTRAMUSCULAR; INTRAVENOUS; SUBCUTANEOUS at 14:16

## 2020-01-22 RX ADMIN — HYDROMORPHONE HYDROCHLORIDE 0.5 MILLIGRAM(S): 2 INJECTION INTRAMUSCULAR; INTRAVENOUS; SUBCUTANEOUS at 17:07

## 2020-01-22 RX ADMIN — Medication 5 MILLIGRAM(S): at 20:00

## 2020-01-22 RX ADMIN — Medication 100 MILLIGRAM(S): at 06:07

## 2020-01-22 RX ADMIN — HYDROMORPHONE HYDROCHLORIDE 0.5 MILLIGRAM(S): 2 INJECTION INTRAMUSCULAR; INTRAVENOUS; SUBCUTANEOUS at 06:20

## 2020-01-22 RX ADMIN — BUDESONIDE AND FORMOTEROL FUMARATE DIHYDRATE 2 PUFF(S): 160; 4.5 AEROSOL RESPIRATORY (INHALATION) at 22:05

## 2020-01-22 RX ADMIN — HEPARIN SODIUM 5000 UNIT(S): 5000 INJECTION INTRAVENOUS; SUBCUTANEOUS at 14:16

## 2020-01-22 RX ADMIN — HYDROMORPHONE HYDROCHLORIDE 0.5 MILLIGRAM(S): 2 INJECTION INTRAMUSCULAR; INTRAVENOUS; SUBCUTANEOUS at 21:09

## 2020-01-22 RX ADMIN — Medication 1000 MILLIGRAM(S): at 08:00

## 2020-01-22 RX ADMIN — HEPARIN SODIUM 5000 UNIT(S): 5000 INJECTION INTRAVENOUS; SUBCUTANEOUS at 21:14

## 2020-01-22 RX ADMIN — HEPARIN SODIUM 5000 UNIT(S): 5000 INJECTION INTRAVENOUS; SUBCUTANEOUS at 05:48

## 2020-01-22 RX ADMIN — HYDROMORPHONE HYDROCHLORIDE 0.5 MILLIGRAM(S): 2 INJECTION INTRAMUSCULAR; INTRAVENOUS; SUBCUTANEOUS at 06:53

## 2020-01-22 RX ADMIN — SODIUM CHLORIDE 90 MILLILITER(S): 9 INJECTION, SOLUTION INTRAVENOUS at 23:07

## 2020-01-22 RX ADMIN — Medication 5 MILLIGRAM(S): at 15:52

## 2020-01-22 RX ADMIN — OXYCODONE HYDROCHLORIDE 10 MILLIGRAM(S): 5 TABLET ORAL at 23:44

## 2020-01-22 RX ADMIN — PANTOPRAZOLE SODIUM 40 MILLIGRAM(S): 20 TABLET, DELAYED RELEASE ORAL at 11:32

## 2020-01-22 NOTE — CONSULT NOTE ADULT - ASSESSMENT
ASSESSMENT:  59y Female ***    PLAN:   Neurologic:   - pain control:     Respiratory:     Cardiovascular:     nondiagnostic stress test 2011; ECHO 5/2012: EF 55-65%, mild MVR, mild TVR,     Gastrointestinal/Nutrition:   EGD 11/25/19: nonerosive gastritis, duodenitis, Surgical Pathology Report: negative for H pylori, mild chronic reactive gastropathy       Renal/Genitourinary:   Hematologic: scd  Infectious Disease:   Lines/Tubes:  Endocrine:   Disposition:     --------------------------------------------------------------------------------------    Critical Care Diagnoses: ASSESSMENT:  59y Female PMH as above with 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, now s/p aorto SMA bypass with ringed PTFE     PLAN:   Neurologic:   - pain control:     Respiratory:     Cardiovascular:     nondiagnostic stress test 2011; ECHO 5/2012: EF 55-65%, mild MVR, mild TVR,     Gastrointestinal/Nutrition:   EGD 11/25/19: nonerosive gastritis, duodenitis, Surgical Pathology Report: negative for H pylori, mild chronic reactive gastropathy       Renal/Genitourinary:   Hematologic: scd  Infectious Disease:   Lines/Tubes:  Endocrine:   Disposition:     --------------------------------------------------------------------------------------    Critical Care Diagnoses: ASSESSMENT:  59y Female PMH as above with 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 No noted pneumothorax on post op CXR, extubated without issue  wean off of nasal cannula as able. Encourage incentive spirometry   - hx COPD   symbicort  spiriva    Cardiovascular:     nondiagnostic stress test 2011; ECHO 5/2012: EF 55-65%, mild MVR, mild TVR,     Gastrointestinal/Nutrition:   EGD 11/25/19: nonerosive gastritis, duodenitis, Surgical Pathology Report: negative for H pylori, mild chronic reactive gastropathy   ppi    Renal/Genitourinary: ns@90  Hematologic: scd, sqh to start 1/22  Infectious Disease: ancef  Lines/Tubes:  Endocrine:   Disposition:     --------------------------------------------------------------------------------------    Critical Care Diagnoses: ASSESSMENT:  59y Female PMH as above with 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 No noted pneumothorax on post op CXR, extubated without issue  wean off of nasal cannula as able. Encourage incentive spirometry   - hx COPD (no home O2, recent exacerbation 11/2019)- Spiriva, breo ellipta and anoro ellipta per patient. Discussed with pharmacy, overlapping effects. Equivalent with Spiriva and Symbicort. Consider changing home med regimen   AM CXR     Cardiovascular:   Normotensive, goal MAP >65. No pressors needed  hx HLD- holding home statin while NPO strict   Cardiac enzymes- negative x1, follow up repeats   nondiagnostic stress test 2011; ECHO 5/2012: EF 55-65%, mild MVR, mild TVR,   EKG with    VASC: s/p aorto-SMA bypass, appropriate abdominal incisional tenderness. Soft abdomen, making appropriate urine output - no current concern for abdominal compartment or thrombosis/ hypoperfusion     Gastrointestinal/Nutrition:   NPO strict, no PO meds per vascular surgery until passing flatus. No NGT   SMA stenosis, s/p bypass- monitor for previously symptomatology of n/v/d  No zofran at this time-   hx gastritis- on omeprazole at home. On Protonix for GI ppx   EGD 11/25/19: nonerosive gastritis, duodenitis, Surgical Pathology Report: negative for H pylori, mild chronic reactive gastropathy   hold bowel regimen at this time    Renal/Genitourinary:   boyd in place, strict I and O monitoring. NPO, NS@90 for hyponatremia, Na 131- continue to monitor   AIDE: BUN/Cr 14/1.5 (baseline 0.7)- continue to trend.   Lactate   Monitor and replete electrolytes as needed. Hypomagnesemic 1.5- repleting. Recheck AM labs     Hematologic:   Hgb 10.6-> 9.7, continue to monitor   DVT ppx:  on SCDs, SQH to start 1/22    Infectious Disease:   On octavio-op Ancef   WBC 16-> 12.2, Afebrile. Monitor for signs of infection     Endocrine:   No diagnosis. Check FSG Q4 while NPO.  in PACU     MSK:  OOBTC   Lines/Tubes: REID Boyd, L radial A line     Disposition: SICU for monitoring of hemodynamic stability, exams     --------------------------------------------------------------------------------------    Critical Care Diagnoses: s/p aorto-SMA bypass

## 2020-01-22 NOTE — PATIENT PROFILE ADULT - NSPROPTRIGHTSUPPORTPERSON_GEN_A_NUR
declines 1- Continue current diet order, which remains appropriate at this time. 2- Monitor weights, labs, BM's, skin integrity, p.o. intake. 3- Multivitamin 4- RD remains available, re-consult as needed. Marvin Agosto RD Pager #[phone]

## 2020-01-22 NOTE — CONSULT NOTE ADULT - ATTENDING COMMENTS
I personally provided over 30 minutes of direct critical care to this patient.  I examined the patient with the PA and resident and discussed my plan with them.    the patient is s/p aorto-sma bypass with post-op pain, anemia of acute blood loss.  she is sicu for risk of cv deterioration  xanax at home, currently on ativan prn, will initiate ciwa, restart po when primary   oxy at home for fibromyalgia  copd not on home o2, last exacerbation november, never intubated, on multiple inhalers, no steroids  currently nc saturating high 90s, wean at  cxr no ptx in setting of diaphragmatic injury  trop neg x2, hld holding po meds at this time  qtc prolonged post-op and zofran held  making urine, minimal abdominal pain  awaiting flatus for advancing diet  protonix iv until po   boyd in place  mild hyponatremia, likely hypovolemic because she responded to fluids and now nml  new leah 1.5, pre-renal vs intra-renal, monitoring for now as making urine  hypomag 1.5 repleted  10.6 to 9.7 hb, trending, no transfusion  sqh today  ancef periop 24h  mild stress hyperglycemia, no coverage needed  mobilize today

## 2020-01-22 NOTE — CONSULT NOTE ADULT - SUBJECTIVE AND OBJECTIVE BOX
SICU Consultation Note  =====================================================  HPI: 59y Female PMH COPD (no home O2, never intubated for), HLD, brain aneurysm x2 (1x coil 20 yrs ago, one monitored by Neuro), GERD, spinal stenosis and chronic pain, fibromyalgia, who was admitted in November with COPD exacerbation, hyponatremia, and n/v/d noted to have chronic SMA stenosis.  She was brought for elective Aorto-SMA bypass today with ringed PTFE graft today, which she tolerated well. In the OR, she had a celiac artery bleed which was repaired primarily with 5-0 prolene, and a small left diaphragmatic injury, s/p repair. She had 8,000u intraop heparin, with EBL 700ml, no transfusions. She tolerated the procedure well, and was extubated post op without issue. She is complaining of mild nausea, of which she states is her baseline intermittent waves of nausea. No emesis, no chest pain, headache, paresthesia, weakness, vision change, abdominal pain.       Surgery Information  OR time: 6.5Hr      EBL:   700ml     IV Fluids:  5500ml     Blood Products: none   UOP:  350ml    Retroperitoneal approach to aorta- aorta SMA bypass with ringed PTFE graft    PAST MEDICAL & SURGICAL HISTORY:  H/O aneurysm: brain x2, 1 s/p coil, one monitored  Fibromyalgia  chronic pain back   Spinal stenosis- cervical   Hypercholesteremia  gastritis   R wrist fracture- pt declined ORIF   COPD (chronic obstructive pulmonary disease)- never intubated for; not on home O2; hospitalized 11/2019   GERD  hyponatremia hx  S/P cerebral aneurysm repair: coil x 20 yrs ago    Home Meds: Home Medications:  albuterol sulfate hfa: 2 puff(s) inhaled 2 times a day (21 Jan 2020 08:29)  Anoro Ellipta 62.5 mcg-25 mcg/inh inhalation powder: 1 puff(s) inhaled once a day (21 Jan 2020 08:29)  breo ellipta  atorvastatin 20 qd  omeprazole  oxyCODONE 30 mg oral tablet: 1 tab(s) orally every 6 hours (21 Jan 2020 08:29)  Symbicort 160 mcg-4.5 mcg/inh inhalation aerosol: 2 puff(s) inhaled 2 times a day, As Needed.Last use 07/10/2019  (21 Jan 2020 08:29)  Xanax 1 mg oral tablet: 1 tab(s) orally once a day (21 Jan 2020 08:29)    Allergies:   No Known Allergies    Soc: Smoker 1/4PPD since 16 yrs old, current smoker  + marijuana use  recreational alcohol use    Advanced Directives: would like  to be health care proxy;  Full Code     ROS:    REVIEW OF SYSTEMS    [x ] A ten-point review of systems was otherwise negative except as noted.  [ ] Due to altered mental status/intubation, subjective information were not able to be obtained from the patient. History was obtained, to the extent possible, from review of the chart and collateral sources of information.      CURRENT MEDICATIONS:   --------------------------------------------------------------------------------------  Neurologic Medications  acetaminophen  IVPB .. 1000 milliGRAM(s) IV Intermittent once  HYDROmorphone  Injectable 0.5 milliGRAM(s) IV Push every 4 hours PRN Moderate Pain (4 - 6)  LORazepam   Injectable 0.25 milliGRAM(s) IV Push every 12 hours PRN Anxiety    Respiratory Medications  budesonide 160 MICROgram(s)/formoterol 4.5 MICROgram(s) Inhaler 2 Puff(s) Inhalation two times a day  tiotropium 18 MICROgram(s) Capsule 1 Capsule(s) Inhalation daily    Cardiovascular Medications    Gastrointestinal Medications  pantoprazole  Injectable 40 milliGRAM(s) IV Push daily  sodium chloride 0.9%. 1000 milliLiter(s) IV Continuous <Continuous>    Genitourinary Medications    Hematologic/Oncologic Medications  heparin  Injectable 5000 Unit(s) SubCutaneous every 8 hours    Antimicrobial/Immunologic Medications  ceFAZolin   IVPB 2000 milliGRAM(s) IV Intermittent every 8 hours    Endocrine/Metabolic Medications    Topical/Other Medications  chlorhexidine 4% Liquid 1 Application(s) Topical <User Schedule>    --------------------------------------------------------------------------------------    VITAL SIGNS, INS/OUTS (last 24 hours):  --------------------------------------------------------------------------------------  ICU Vital Signs Last 24 Hrs  T(C): 36.6 (21 Jan 2020 21:00), Max: 36.9 (21 Jan 2020 18:20)  T(F): 97.9 (21 Jan 2020 21:00), Max: 98.5 (21 Jan 2020 18:20)  HR: 85 (21 Jan 2020 23:00) (69 - 92)  BP: 114/55 (21 Jan 2020 23:00) (114/55 - 136/66)  BP(mean): --  ABP: 136/49 (21 Jan 2020 23:00) (121/46 - 174/53)  ABP(mean): 80 (21 Jan 2020 22:00) (72 - 88)  RR: 17 (21 Jan 2020 23:00) (14 - 19)  SpO2: 96% (21 Jan 2020 23:00) (96% - 99%)    I&O's Summary    21 Jan 2020 07:01  -  22 Jan 2020 00:26  --------------------------------------------------------  IN: 390 mL / OUT: 345 mL / NET: 45 mL      --------------------------------------------------------------------------------------    EXAM:  General/Neuro  RASS: 0  GCS: 15  Exam: Normal, NAD, alert and oriented x 3, no focal deficits. PERRL. Speaking in full sentences, mild hoarse voice.   Moving all extremities, following commands    Respiratory  Exam: On nasal cannula, fair aeration throughout. No adventitious sounds, no wheezing. Lungs clear to auscultation, Normal expansion/effort.    Cardiovascular  Exam: S1, S2.  Regular rate and rhythm.  No noted peripheral edema   Cardiac Rhythm: Normal Sinus Rhythm    GI  Exam: Abdomen soft, minimally appropriately tender periincisionally, non-distended.  Wound: left abdominal incision occlusive dressing c/d/i      Tubes/Lines/Drains  ***  [x] Peripheral IV  [x] Arterial Line		L radial	  Date Placed: 1/21      [x] Urinary Catheter		Date Placed: 1/21    Extremities  Exam: Extremities warm, pink, well-perfused.      Derm:  Exam: Good skin turgor, no skin breakdown.      :   Exam: Bryan catheter in place.     LABS  --------------------------------------------------------------------------------------  Labs:  CAPILLARY BLOOD GLUCOSE  POCT Blood Glucose.: 159 mg/dL (21 Jan 2020 22:51)                          10.6   16.03 )-----------( 259      ( 21 Jan 2020 19:50 )             31.9         01-21    131<L>  |  102  |  14  ----------------------------<  166<H>  4.8   |  20  |  1.5      Calcium, Total Serum: 8.2 mg/dL (01-21-20 @ 19:50)      LFTs:         Coags:    CARDIAC MARKERS ( 21 Jan 2020 19:50 )  x     / <0.01 ng/mL / 674 U/L / x     / 11.1 ng/mL                --------------------------------------------------------------------------------------  OTHER LABS    IMAGING RESULTS  < from: CT Abdomen and Pelvis w/ IV Cont (11.23.19 @ 22:13) >  IMPRESSION:  Findings are suspicious for gastritis involving the antrum. Nonemergent   upper endoscopy may be of benefit.  Severe diffuse atherosclerosis with long segment of severe narrowing   involving proximal SMA.        < from: Xray Chest 2 Views PA/Lat (11.23.19 @ 17:57) >  IMPRESSION:  No evidence of acute cardiopulmonary disease.     < end of copied text > SICU Consultation Note  =====================================================  HPI: 59y Female PMH COPD (no home O2, never intubated for), HLD, brain aneurysm x2 (1x coil 20 yrs ago, one monitored by Neuro), GERD, spinal stenosis and chronic pain, fibromyalgia, who was admitted in November with COPD exacerbation, hyponatremia, and n/v/d noted to have chronic SMA stenosis.  She was brought for elective Aorto-SMA bypass today with ringed PTFE graft today, which she tolerated well. In the OR, she had a celiac artery bleed which was repaired primarily with 5-0 prolene, and a small left diaphragmatic injury, s/p repair. She had 8,000u intraop heparin, with EBL 700ml, no transfusions. She tolerated the procedure well, and was extubated post op without issue. She is complaining of mild nausea, of which she states is her baseline intermittent waves of nausea. No emesis, no chest pain, headache, paresthesia, weakness, vision change, abdominal pain.       Surgery Information  OR time: 6.5Hr      EBL:   700ml     IV Fluids:  5500ml     Blood Products: none   UOP:  350ml    Retroperitoneal approach to aorta- aorta SMA bypass with ringed PTFE graft    PAST MEDICAL & SURGICAL HISTORY:  H/O aneurysm: brain x2, 1 s/p coil, one monitored  Fibromyalgia  chronic pain back   Spinal stenosis- cervical   Hypercholesteremia  gastritis   R wrist fracture- pt declined ORIF   COPD (chronic obstructive pulmonary disease)- never intubated for; not on home O2; hospitalized 11/2019   GERD  hyponatremia hx  S/P cerebral aneurysm repair: coil x 20 yrs ago    Home Meds: Home Medications:  albuterol sulfate hfa: 2 puff(s) inhaled 2 times a day (21 Jan 2020 08:29)  Anoro Ellipta 62.5 mcg-25 mcg/inh inhalation powder: 1 puff(s) inhaled once a day (21 Jan 2020 08:29)  breo ellipta  atorvastatin 20 qd  omeprazole  oxyCODONE 30 mg oral tablet: 1 tab(s) orally every 6 hours (21 Jan 2020 08:29)  Symbicort 160 mcg-4.5 mcg/inh inhalation aerosol: 2 puff(s) inhaled 2 times a day, As Needed.Last use 07/10/2019  (21 Jan 2020 08:29)  Xanax 1 mg oral tablet: 1 tab(s) orally once a day (21 Jan 2020 08:29)    Allergies:   No Known Allergies    Soc: Smoker 1/4PPD since 16 yrs old, current smoker  + marijuana use  recreational alcohol use    Advanced Directives: would like  to be health care proxy;  Full Code     ROS:    REVIEW OF SYSTEMS    [x ] A ten-point review of systems was otherwise negative except as noted.  [ ] Due to altered mental status/intubation, subjective information were not able to be obtained from the patient. History was obtained, to the extent possible, from review of the chart and collateral sources of information.      CURRENT MEDICATIONS:   --------------------------------------------------------------------------------------  Neurologic Medications  acetaminophen  IVPB .. 1000 milliGRAM(s) IV Intermittent once  HYDROmorphone  Injectable 0.5 milliGRAM(s) IV Push every 4 hours PRN Moderate Pain (4 - 6)  LORazepam   Injectable 0.25 milliGRAM(s) IV Push every 12 hours PRN Anxiety    Respiratory Medications  budesonide 160 MICROgram(s)/formoterol 4.5 MICROgram(s) Inhaler 2 Puff(s) Inhalation two times a day  tiotropium 18 MICROgram(s) Capsule 1 Capsule(s) Inhalation daily    Cardiovascular Medications    Gastrointestinal Medications  pantoprazole  Injectable 40 milliGRAM(s) IV Push daily  sodium chloride 0.9%. 1000 milliLiter(s) IV Continuous <Continuous>    Genitourinary Medications    Hematologic/Oncologic Medications  heparin  Injectable 5000 Unit(s) SubCutaneous every 8 hours    Antimicrobial/Immunologic Medications  ceFAZolin   IVPB 2000 milliGRAM(s) IV Intermittent every 8 hours    Endocrine/Metabolic Medications    Topical/Other Medications  chlorhexidine 4% Liquid 1 Application(s) Topical <User Schedule>    --------------------------------------------------------------------------------------    VITAL SIGNS, INS/OUTS (last 24 hours):  --------------------------------------------------------------------------------------  ICU Vital Signs Last 24 Hrs  T(C): 36.6 (21 Jan 2020 21:00), Max: 36.9 (21 Jan 2020 18:20)  T(F): 97.9 (21 Jan 2020 21:00), Max: 98.5 (21 Jan 2020 18:20)  HR: 85 (21 Jan 2020 23:00) (69 - 92)  BP: 114/55 (21 Jan 2020 23:00) (114/55 - 136/66)  BP(mean): --  ABP: 136/49 (21 Jan 2020 23:00) (121/46 - 174/53)  ABP(mean): 80 (21 Jan 2020 22:00) (72 - 88)  RR: 17 (21 Jan 2020 23:00) (14 - 19)  SpO2: 96% (21 Jan 2020 23:00) (96% - 99%)    I&O's Summary    21 Jan 2020 07:01  -  22 Jan 2020 00:26  --------------------------------------------------------  IN: 390 mL / OUT: 345 mL / NET: 45 mL      --------------------------------------------------------------------------------------    EXAM:  General/Neuro  RASS: 0  GCS: 15  Exam: Normal, NAD, alert and oriented x 3, no focal deficits. PERRL. Speaking in full sentences, mild hoarse voice.   Moving all extremities, following commands    Respiratory  Exam: On nasal cannula, fair aeration throughout. No adventitious sounds, no wheezing. Lungs clear to auscultation, Normal expansion/effort.    Cardiovascular  Exam: S1, S2.  Regular rate and rhythm.  No noted peripheral edema   Cardiac Rhythm: Normal Sinus Rhythm    GI  Exam: Abdomen soft, minimally appropriately tender periincisionally, non-distended.  Wound: left abdominal incision occlusive dressing c/d/i      Tubes/Lines/Drains  ***  [x] Peripheral IV  [x] Arterial Line		L radial	  Date Placed: 1/21      [x] Urinary Catheter		Date Placed: 1/21    Extremities  Exam: Extremities warm, pink, well-perfused.  Latrell RUIZ    Derm:  Exam: Good skin turgor, no skin breakdown.      :   Exam: Bryan catheter in place.     LABS  --------------------------------------------------------------------------------------  Labs:  CAPILLARY BLOOD GLUCOSE  POCT Blood Glucose.: 159 mg/dL (21 Jan 2020 22:51)                          10.6   16.03 )-----------( 259      ( 21 Jan 2020 19:50 )             31.9         01-21    131<L>  |  102  |  14  ----------------------------<  166<H>  4.8   |  20  |  1.5      Calcium, Total Serum: 8.2 mg/dL (01-21-20 @ 19:50)      LFTs:         Coags:    CARDIAC MARKERS ( 21 Jan 2020 19:50 )  x     / <0.01 ng/mL / 674 U/L / x     / 11.1 ng/mL                --------------------------------------------------------------------------------------  OTHER LABS    IMAGING RESULTS  < from: CT Abdomen and Pelvis w/ IV Cont (11.23.19 @ 22:13) >  IMPRESSION:  Findings are suspicious for gastritis involving the antrum. Nonemergent   upper endoscopy may be of benefit.  Severe diffuse atherosclerosis with long segment of severe narrowing   involving proximal SMA.        < from: Xray Chest 2 Views PA/Lat (11.23.19 @ 17:57) >  IMPRESSION:  No evidence of acute cardiopulmonary disease.     < end of copied text >

## 2020-01-22 NOTE — PROGRESS NOTE ADULT - SUBJECTIVE AND OBJECTIVE BOX
Procedure: Status post   Bypass of superior mesenteric artery       Operative Findings:  · Operative Findings	Retroperitoneal approach to Aorta Aorto-SMA bypass with ringed PTFE graft.	        Post Operative day #1  Patient resting comfortably no complaints     pmh:  H/O aneurysm  SOB (shortness of breath)  Fibromyalgia  Spinal stenosis  Hypercholesteremia  COPD (chronic obstructive pulmonary disease)  Superior mesenteric artery stenosis  Superior mesenteric artery stenosis  Bypass of superior mesenteric artery  S/P cerebral aneurysm repair    No Known Allergies    budesonide 160 MICROgram(s)/formoterol 4.5 MICROgram(s) Inhaler 2 Puff(s) Inhalation two times a day  ceFAZolin   IVPB 2000 milliGRAM(s) IV Intermittent every 8 hours  chlorhexidine 4% Liquid 1 Application(s) Topical <User Schedule>  HYDROmorphone  Injectable 0.5 milliGRAM(s) IV Push every 4 hours PRN  LORazepam   Injectable 0.25 milliGRAM(s) IV Push every 12 hours PRN  pantoprazole  Injectable 40 milliGRAM(s) IV Push daily  sodium chloride 0.9%. 1000 milliLiter(s) IV Continuous <Continuous>  tiotropium 18 MICROgram(s) Capsule 1 Capsule(s) Inhalation daily          T(C): 36.6 (20 @ 21:00), Max: 36.9 (20 @ 18:20)  HR: 85 (20 @ 23:00) (69 - 92)  BP: 114/55 (20 @ 23:00) (114/55 - 136/66)  RR: 17 (20 @ 23:00) (14 - 19)  SpO2: 96% (20 @ 23:00) (96% - 99%)    20 @ 07:01  -  20 @ 00:02  --------------------------------------------------------  IN: 390 mL / OUT: 345 mL / NET: 45 mL        General:Alert and oriented times 3, not in acute distress   Heart: Regular rate and rhythm, no rubs , murmurs or gallops  Lungs: Clear to auscultation bilaterally, no wheezes, rales, rhonci appreciated  Abdomen: Soft , positive bowel sounds, no tenderness, no distention, no peritoneal signs, incision clean dry and intact   Exremites: warm extremities,  good color, no swelling, motor and sensation , pulses                 10.6   16.03 )-----------( 259      (  @ 19:50 )             31.9                    131   |  102   |  14                 Ca: 8.2    BMP:   ----------------------------< 166    M.5   (20 @ 19:50)             4.8    |  20    | 1.5                Ph: 4.6                  CARDIAC MARKERS ( 2020 19:50 )  x     / <0.01 ng/mL / 674 U/L / x     / 11.1 ng/mL                          Plan and assesment:  Pt. 59yFemale status post Bypass of superior mesenteric artery    -icu management  -Pain management  - Diet NPO  -abx  -pulse checks  -home meds       CAROL Myers   20 @ 00:02  # [id number]/ [id number]

## 2020-01-23 LAB
GLUCOSE BLDC GLUCOMTR-MCNC: 116 MG/DL — HIGH (ref 70–99)
GLUCOSE BLDC GLUCOMTR-MCNC: 121 MG/DL — HIGH (ref 70–99)
GLUCOSE BLDC GLUCOMTR-MCNC: 123 MG/DL — HIGH (ref 70–99)
GLUCOSE BLDC GLUCOMTR-MCNC: 86 MG/DL — SIGNIFICANT CHANGE UP (ref 70–99)

## 2020-01-23 PROCEDURE — 99232 SBSQ HOSP IP/OBS MODERATE 35: CPT

## 2020-01-23 PROCEDURE — 99024 POSTOP FOLLOW-UP VISIT: CPT

## 2020-01-23 PROCEDURE — 71045 X-RAY EXAM CHEST 1 VIEW: CPT | Mod: 26

## 2020-01-23 RX ORDER — ATORVASTATIN CALCIUM 80 MG/1
20 TABLET, FILM COATED ORAL AT BEDTIME
Refills: 0 | Status: DISCONTINUED | OUTPATIENT
Start: 2020-01-23 | End: 2020-01-28

## 2020-01-23 RX ORDER — ACETAMINOPHEN 500 MG
650 TABLET ORAL EVERY 6 HOURS
Refills: 0 | Status: COMPLETED | OUTPATIENT
Start: 2020-01-23 | End: 2020-01-25

## 2020-01-23 RX ORDER — ALPRAZOLAM 0.25 MG
1 TABLET ORAL DAILY
Refills: 0 | Status: DISCONTINUED | OUTPATIENT
Start: 2020-01-23 | End: 2020-01-28

## 2020-01-23 RX ADMIN — Medication 650 MILLIGRAM(S): at 05:13

## 2020-01-23 RX ADMIN — OXYCODONE HYDROCHLORIDE 10 MILLIGRAM(S): 5 TABLET ORAL at 13:46

## 2020-01-23 RX ADMIN — Medication 650 MILLIGRAM(S): at 05:11

## 2020-01-23 RX ADMIN — HEPARIN SODIUM 5000 UNIT(S): 5000 INJECTION INTRAVENOUS; SUBCUTANEOUS at 13:37

## 2020-01-23 RX ADMIN — BUDESONIDE AND FORMOTEROL FUMARATE DIHYDRATE 2 PUFF(S): 160; 4.5 AEROSOL RESPIRATORY (INHALATION) at 21:38

## 2020-01-23 RX ADMIN — OXYCODONE HYDROCHLORIDE 10 MILLIGRAM(S): 5 TABLET ORAL at 17:48

## 2020-01-23 RX ADMIN — Medication 650 MILLIGRAM(S): at 23:58

## 2020-01-23 RX ADMIN — Medication 1 MILLIGRAM(S): at 11:09

## 2020-01-23 RX ADMIN — OXYCODONE HYDROCHLORIDE 10 MILLIGRAM(S): 5 TABLET ORAL at 03:52

## 2020-01-23 RX ADMIN — Medication 650 MILLIGRAM(S): at 11:09

## 2020-01-23 RX ADMIN — OXYCODONE HYDROCHLORIDE 10 MILLIGRAM(S): 5 TABLET ORAL at 14:16

## 2020-01-23 RX ADMIN — OXYCODONE HYDROCHLORIDE 10 MILLIGRAM(S): 5 TABLET ORAL at 00:10

## 2020-01-23 RX ADMIN — OXYCODONE HYDROCHLORIDE 10 MILLIGRAM(S): 5 TABLET ORAL at 08:09

## 2020-01-23 RX ADMIN — Medication 650 MILLIGRAM(S): at 17:12

## 2020-01-23 RX ADMIN — ATORVASTATIN CALCIUM 20 MILLIGRAM(S): 80 TABLET, FILM COATED ORAL at 21:03

## 2020-01-23 RX ADMIN — HEPARIN SODIUM 5000 UNIT(S): 5000 INJECTION INTRAVENOUS; SUBCUTANEOUS at 21:03

## 2020-01-23 RX ADMIN — PANTOPRAZOLE SODIUM 40 MILLIGRAM(S): 20 TABLET, DELAYED RELEASE ORAL at 12:00

## 2020-01-23 RX ADMIN — BUDESONIDE AND FORMOTEROL FUMARATE DIHYDRATE 2 PUFF(S): 160; 4.5 AEROSOL RESPIRATORY (INHALATION) at 07:51

## 2020-01-23 RX ADMIN — HEPARIN SODIUM 5000 UNIT(S): 5000 INJECTION INTRAVENOUS; SUBCUTANEOUS at 05:11

## 2020-01-23 RX ADMIN — OXYCODONE HYDROCHLORIDE 10 MILLIGRAM(S): 5 TABLET ORAL at 05:08

## 2020-01-23 RX ADMIN — SENNA PLUS 2 TABLET(S): 8.6 TABLET ORAL at 21:03

## 2020-01-23 NOTE — PROGRESS NOTE ADULT - ASSESSMENT
ASSESSMENT:  59y Female PMH as above with 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, now s/p aorto SMA bypass with ringed PTFE     PLAN:   Neurologic:   - pain control: PO tylenol, started on oxycodone PRN   - anxiety: takes Xanax at home, CIWA to eval if BZD withdrawal, consider restart   - hx fibromyalgia, cervical spinal stenosis with chronic pain   - hx brain aneurysm x2- s/p coil x1 20 yrs ago, one monitored without intervention   - hx migraines     Respiratory:   - diaphragmatic injury intraop- repaired. No noted pneumothorax on post op CXR, extubated without issue  Intermittently on and off nasal cannula as able. Encourage incentive spirometry   - hx COPD (no home O2, recent exacerbation 11/2019)- Spiriva, anoro ellipta per patient. Discussed with pharmacy, overlapping effects. Equivalent with Spiriva and Symbicort. Consider changing home med regimen. Pt refused medications during the day, PM wheeze and tightness, resolved with symbicort. Family to bring in Anura  AM CXR     Cardiovascular:   Normotensive, goal MAP >65. Intermittently hypertensive, improved with pain control  hx HLD- holding home statin while NPO strict   Cardiac enzymes- negative   nondiagnostic stress test 2011; ECHO 5/2012: EF 55-65%, mild MVR, mild TVR,   EKG with    VASC: s/p aorto-SMA bypass, appropriate abdominal incisional tenderness. Soft abdomen, making appropriate urine output - no current concern for abdominal compartment or thrombosis/ hypoperfusion     Gastrointestinal/Nutrition:   Sips and chips, meds - no bowel movement yet   SMA stenosis, s/p bypass- monitor for previously symptomatology of n/v/d  No zofran at this time- . No further nausea  hx gastritis- on omeprazole at home. On Protonix for GI ppx   EGD 11/25/19: nonerosive gastritis, duodenitis, Surgical Pathology Report: negative for H pylori, mild chronic reactive gastropathy   senna restarted     Renal/Genitourinary:   boyd in place, strict I and O monitoring. NPO, changed to LR from NS as resolved hyponatremia   AIDE: BUN/Cr 14/1.5 (baseline 0.7)- 1.6, 1.6-> 1.3, resolving AIDE. Clarification with vascular surgery: no ischemic time clamp time for renal hypoperfusion as used side biting clamp on aorta for local control   Monitor and replete electrolytes as needed. No repletions needed    Hematologic:   Hgb 8.2 (8.1), stabilizing.  continue to monitor   DVT ppx:  on SCDs, SQH     Infectious Disease:   Lalita-op Ancef completed  WBC 16-> 12.2-> 9.6-> 11.3, Afebrile, Tmax____. Monitor for signs of infection     Endocrine:   No diagnosis. Check FSG Q4 while NPO. FSG ______    MSK:  OOBTC   Lines/Tubes: Boyd, PIV, L radial A line     Disposition: SICU for monitoring of hemodynamic stability, exams ASSESSMENT:  59y Female PMH as above with 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, now s/p aorto SMA bypass with ringed PTFE     PLAN:   Neurologic:   - pain control: PO tylenol, started on oxycodone PRN   - anxiety: takes Xanax at home, CIWA to eval if BZD withdrawal, consider restart home xanax today   - hx fibromyalgia, cervical spinal stenosis with chronic pain   - hx brain aneurysm x2- s/p coil x1 20 yrs ago, one monitored without intervention   - hx migraines     Respiratory:   - diaphragmatic injury intraop- repaired. No noted pneumothorax on post op CXR, extubated without issue  Intermittently on and off nasal cannula as able. Encourage incentive spirometry   - hx COPD (no home O2, recent exacerbation 11/2019)- Spiriva, anoro ellipta per patient. Discussed with pharmacy, overlapping effects. Equivalent with Spiriva and Symbicort. Consider changing home med regimen. Pt refused medications during the day, PM wheeze and tightness, resolved with symbicort. Family to bring in Anura  AM CXR     Cardiovascular:   Normotensive, goal MAP >65. Intermittently hypertensive, improved with pain control  hx HLD- holding home statin while NPO strict   Cardiac enzymes- negative   nondiagnostic stress test 2011; ECHO 5/2012: EF 55-65%, mild MVR, mild TVR,   EKG with    VASC: s/p aorto-SMA bypass, appropriate abdominal incisional tenderness. Soft abdomen, making appropriate urine output - no current concern for abdominal compartment or thrombosis/ hypoperfusion     Gastrointestinal/Nutrition:   Sips and chips, meds - + flatus, no bowel movement yet . Per vascular, may advance to CLD this AM   SMA stenosis, s/p bypass- monitor for previously symptomatology of n/v/d  No zofran at this time- . No further nausea  hx gastritis- on omeprazole at home. On Protonix for GI ppx   EGD 11/25/19: nonerosive gastritis, duodenitis, Surgical Pathology Report: negative for H pylori, mild chronic reactive gastropathy   senna restarted     Renal/Genitourinary:   boyd in place, strict I and O monitoring. NPO, changed to LR from NS as resolved hyponatremia   AIDE: BUN/Cr 14/1.5 (baseline 0.7)- 1.6, 1.6-> 1.3, resolving AIDE. Clarification with vascular surgery: no ischemic time clamp time for renal hypoperfusion as used side biting clamp on aorta for local control   Monitor and replete electrolytes as needed. No repletions needed    Hematologic:   Hgb 8.2 (8.1), stabilizing.  continue to monitor   DVT ppx:  on SCDs, SQH     Infectious Disease:   Lalita-op Ancef completed  WBC 16-> 12.2-> 9.6-> 11.3, Afebrile, Tmax 98.8. Monitor for signs of infection     Endocrine:   No diagnosis. Check FSG Q4 while NPO.  (106-113)    MSK:  OOBTC     Lines/Tubes: Boyd, PIV, L radial A line     Disposition: SICU for monitoring of hemodynamic stability, exams-> downgrade this AM

## 2020-01-23 NOTE — PROGRESS NOTE ADULT - SUBJECTIVE AND OBJECTIVE BOX
GENERAL SURGERY PROGRESS NOTE     HARI VÁSQUEZ  59y  Female  Hospital day: 3  POD: 2  Procedure: Bypass of superior mesenteric artery    OVERNIGHT EVENTS: No acute events overnight, BP WNL and     T(F): 98.4 (01-23-20 @ 06:00), Max: 98.8 (01-22-20 @ 08:05)  HR: 68 (01-23-20 @ 06:00) (66 - 96)  BP: 118/72 (01-23-20 @ 00:00) (112/53 - 131/60)  ABP: 151/51 (01-23-20 @ 06:00) (118/54 - 183/64)  ABP(mean): 83 (01-22-20 @ 21:00) (79 - 97)  RR: 18 (01-23-20 @ 06:00) (15 - 30)  SpO2: 94% (01-23-20 @ 06:00) (92% - 99%)    DIET/FLUIDS: lactated ringers. 1000 milliLiter(s) IV Continuous <Continuous>    URINE:   01-21-20 @ 07:01  -  01-22-20 @ 07:00  --------------------------------------------------------  OUT: 870 mL       GI proph:  pantoprazole  Injectable 40 milliGRAM(s) IV Push daily    AC/ proph: heparin  Injectable 5000 Unit(s) SubCutaneous every 8 hours    PHYSICAL EXAM:  General: Alert and oriented times 3, not in acute distress   Heart: Regular rate and rhythm, no rubs , murmurs or gallops  Lungs: Clear to auscultation bilaterally, no wheezes, rales, rhonchi appreciated  Abdomen: Soft , positive bowel sounds, no tenderness, no distention, no peritoneal signs, incision clean dry and intact   Extremities: warm extremities,  good color, no swelling, motor and sensation , pulses       LABS    POCT Blood Glucose.: 116 mg/dL (23 Jan 2020 04:12)  POCT Blood Glucose.: 116 mg/dL (22 Jan 2020 23:58)  POCT Blood Glucose.: 116 mg/dL (22 Jan 2020 22:14)  POCT Blood Glucose.: 111 mg/dL (22 Jan 2020 18:25)  POCT Blood Glucose.: 106 mg/dL (22 Jan 2020 14:29)  POCT Blood Glucose.: 113 mg/dL (22 Jan 2020 10:16)  POCT Blood Glucose.: 128 mg/dL (22 Jan 2020 06:43)                          8.2    11.32 )-----------( 182      ( 22 Jan 2020 22:48 )             24.6         01-22    138  |  107  |  17  ----------------------------<  112<H>  4.2   |  19  |  1.3      Calcium, Total Serum: 7.6 mg/dL (01-22-20 @ 22:48)    Coags:     11.10  ----< 0.96    ( 22 Jan 2020 22:48 )     28.4      CARDIAC MARKERS ( 22 Jan 2020 10:45 )  x     / x     / 1096 U/L / x     / x      CARDIAC MARKERS ( 22 Jan 2020 08:00 )  x     / <0.01 ng/mL / 1099 U/L / x     / 18.3 ng/mL  CARDIAC MARKERS ( 22 Jan 2020 02:00 )  x     / <0.01 ng/mL / 1015 U/L / x     / 19.1 ng/mL  CARDIAC MARKERS ( 21 Jan 2020 19:50 )  x     / <0.01 ng/mL / 674 U/L / x     / 11.1 ng/mL    RADIOLOGY & ADDITIONAL TESTS:  < from: Xray Chest 1 View- PORTABLE-Routine (01.22.20 @ 06:59) >  Impression:      No radiographic evidence of acute cardiopulmonary disease. GENERAL SURGERY PROGRESS NOTE     HARI VÁSQUEZ  59y  Female  Hospital day: 3  POD: 2  Procedure: Bypass of superior mesenteric artery    OVERNIGHT EVENTS: No acute events overnight, BP WNL, hgb stable at 8.2 from 8.1.     T(F): 98.4 (01-23-20 @ 06:00), Max: 98.8 (01-22-20 @ 08:05)  HR: 68 (01-23-20 @ 06:00) (66 - 96)  BP: 118/72 (01-23-20 @ 00:00) (112/53 - 131/60)  ABP: 151/51 (01-23-20 @ 06:00) (118/54 - 183/64)  ABP(mean): 83 (01-22-20 @ 21:00) (79 - 97)  RR: 18 (01-23-20 @ 06:00) (15 - 30)  SpO2: 94% (01-23-20 @ 06:00) (92% - 99%)    DIET/FLUIDS: lactated ringers. 1000 milliLiter(s) IV Continuous <Continuous>    URINE:   01-21-20 @ 07:01  -  01-22-20 @ 07:00  --------------------------------------------------------  OUT: 870 mL       GI proph:  pantoprazole  Injectable 40 milliGRAM(s) IV Push daily    AC/ proph: heparin  Injectable 5000 Unit(s) SubCutaneous every 8 hours    PHYSICAL EXAM:  General: Alert and oriented times 3, not in acute distress   Heart: Regular rate and rhythm, no rubs , murmurs or gallops  Lungs: Clear to auscultation bilaterally, no wheezes, rales, rhonchi appreciated  Abdomen: Soft , positive bowel sounds, no tenderness, no distention, no peritoneal signs, incision clean dry and intact   Extremities: warm extremities,  good color, no swelling, motor and sensation , pulses       LABS    POCT Blood Glucose.: 116 mg/dL (23 Jan 2020 04:12)  POCT Blood Glucose.: 116 mg/dL (22 Jan 2020 23:58)  POCT Blood Glucose.: 116 mg/dL (22 Jan 2020 22:14)  POCT Blood Glucose.: 111 mg/dL (22 Jan 2020 18:25)  POCT Blood Glucose.: 106 mg/dL (22 Jan 2020 14:29)  POCT Blood Glucose.: 113 mg/dL (22 Jan 2020 10:16)  POCT Blood Glucose.: 128 mg/dL (22 Jan 2020 06:43)                          8.2    11.32 )-----------( 182      ( 22 Jan 2020 22:48 )             24.6         01-22    138  |  107  |  17  ----------------------------<  112<H>  4.2   |  19  |  1.3      Calcium, Total Serum: 7.6 mg/dL (01-22-20 @ 22:48)    Coags:     11.10  ----< 0.96    ( 22 Jan 2020 22:48 )     28.4      CARDIAC MARKERS ( 22 Jan 2020 10:45 )  x     / x     / 1096 U/L / x     / x      CARDIAC MARKERS ( 22 Jan 2020 08:00 )  x     / <0.01 ng/mL / 1099 U/L / x     / 18.3 ng/mL  CARDIAC MARKERS ( 22 Jan 2020 02:00 )  x     / <0.01 ng/mL / 1015 U/L / x     / 19.1 ng/mL  CARDIAC MARKERS ( 21 Jan 2020 19:50 )  x     / <0.01 ng/mL / 674 U/L / x     / 11.1 ng/mL    RADIOLOGY & ADDITIONAL TESTS:  < from: Xray Chest 1 View- PORTABLE-Routine (01.22.20 @ 06:59) >  Impression:      No radiographic evidence of acute cardiopulmonary disease.

## 2020-01-23 NOTE — PROGRESS NOTE ADULT - ATTENDING COMMENTS
s/p SMA bypass    doing well  HD stable  incisional pain   OOB to chair  f/u CR and CK
I examined the patient with the pa and resident and discussed my plan with them    can restart home po axioytis  htn, cuff and chau not correlating, no need to start po  leah resolving, 1.3  cld this am, will reduce fluids  hb 8 stable, anemia of acute blood loss, no transfusion  pt can be downgraded

## 2020-01-23 NOTE — PROGRESS NOTE ADULT - ASSESSMENT
59F s/p SMA bypass    -Hemodynamically stable  -Adequate pain control   -OOB to chair  -f/u CR and CK

## 2020-01-23 NOTE — CHART NOTE - NSCHARTNOTEFT_GEN_A_CORE
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  She is s/p elective Aorto-SMA bypass with ringed PTFE graft on 1/21/20.  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  In the SICU, she has been stable, received labetalol 5 IVP x1 for one episode of SBP in 180.  Patient is not on home anti-HTN meds. She is restarted on home Xanax and advanced to a CLD. HSQ has been started. Bowel regimen started.    PHYSICAL EXAM:    General/Neuro  Deficits:  alert & oriented x 3, no focal deficits. RUIZ,     Lungs:  intermittently on nasal cannula, clear to auscultation, fair aeration.  Normal expansion/effort.     Cardiovascular : S1, S2.  Regular rate and rhythm. No noted peripheral edema  Cardiac Rhythm: Normal Sinus Rhythm    GI: Abdomen soft, octavio-incisional tenderness, Non-distended.    Wound: Left horizontal incision left abdomen, dressing c/d/i     Extremities: Extremities warm, pink, well-perfused.  RUIZ, SCDs in place. L radial a-line.    Derm: Good skin turgor, no skin breakdown.      : Bryan discontinued.    ASSESSMENT/PLAN:     Neurologic:   - pain control: PO tylenol, started on oxycodone PRN   - anxiety: restarted home Xanax  - hx fibromyalgia, cervical spinal stenosis with chronic pain   - hx brain aneurysm x2- s/p coil x1 20 yrs ago, one monitored without intervention   - hx migraines     Respiratory:   -diaphragmatic injury intraop- repaired. No noted pneumothorax on post op CXR, extubated without issue  Intermittently on and off nasal cannula as able. Encourage incentive spirometry   -hx COPD (no home O2, recent exacerbation 11/2019)- Spiriva, anoro ellipta per patient. On Symbicort and Spiriva here  -AM CXR     Cardiovascular:   Normotensive, goal MAP >65. Intermittently hypertensive, improves with pain control, received Labetalol 5 IVP x1 during entire SICU stay   hx HLD- Restarted home atorvastatin   Cardiac enzymes- negative x3  nondiagnostic stress test 2011; ECHO 5/2012: EF 55-65%, mild MVR, mild TVR,   EKG with    VASC: s/p aorto-SMA bypass, appropriate abdominal incisional tenderness. Soft abdomen, making appropriate urine output - no current concern for abdominal compartment or thrombosis/ hypoperfusion     Gastrointestinal/Nutrition:   Advanced to CLD  SMA stenosis, s/p bypass- monitor for previously symptomatology of n/v/d  No zofran at this time- . No further nausea  hx gastritis- on omeprazole at home. On Protonix for GI ppx   EGD 11/25/19: nonerosive gastritis, duodenitis, Surgical Pathology Report: negative for H pylori, mild chronic reactive gastropathy   Senna restarted     Renal/Genitourinary:   Bryan d/c'd TOV at 6pm  AIDE: BUN/Cr 14/1.5 (baseline 0.7)- 1.6, 1.6-> 1.3, resolving AIDE. Clarification with vascular surgery: no ischemic time clamp time for renal hypoperfusion as used side biting clamp on aorta for local control   Monitor and replete electrolytes as needed.     Hematologic:   Hgb 8.2 (8.1), stabilizing.  continue to monitor   DVT ppx:  on SCDs, SQH     Infectious Disease:   Octavio-op Ancef completed  WBC 16-> 12.2-> 9.6-> 11.3, Afebrile, Tmax 98.8. Monitor for signs of infection     Endocrine:   No diagnosis. FS q6.    MSK:  OOBTC     FOLLOW UP:  TOV @ 6pm  2330 labs    Signed out to ADRY Johnson @ 11:08 AM on 1/23/2020

## 2020-01-23 NOTE — PROGRESS NOTE ADULT - SUBJECTIVE AND OBJECTIVE BOX
HARI VÁSQUEZ  [id number]  59y Female    Indication for ICU admission:  Admit Date:01-21-20  ICU Date:  OR Date:    No Known Allergies    PAST MEDICAL & SURGICAL HISTORY:  H/O aneurysm: brain x2, 1 s/p coil, one monitored  Fibromyalgia  chronic pain back   Spinal stenosis- cervical   Hypercholesteremia  gastritis   R wrist fracture- pt declined ORIF   COPD (chronic obstructive pulmonary disease)- never intubated for; not on home O2; hospitalized 11/2019   GERD  hyponatremia hx  S/P cerebral aneurysm repair: coil x 20 yrs ago      Home Medications:  albuterol sulfate hfa: 2 puff(s) inhaled 2 times a day (21 Jan 2020 08:29)  Anoro Ellipta 62.5 mcg-25 mcg/inh inhalation powder: 1 puff(s) inhaled once a day (21 Jan 2020 08:29)  atorvastatin 20 qd  omeprazole  oxyCODONE 30 mg oral tablet: 1 tab(s) orally every 6 hours (21 Jan 2020 08:29)  Xanax 1 mg oral tablet: 1 tab(s) orally once a day (21 Jan 2020 08:29)      24HRS EVENT:  Overnight: creatinine improved 1.6-> 1.3. Clarified with vascular- had site biting clamp of aorta- no cross clamp ischemic time for renals  added PO pain regimen, senna  pt had refused inhalers during day, then pm wheeze and tightness- improved with symbicort pm dose given  + coughing. 94% sat on room air  TTP at incision  attempted BM, unable  HTN- improved with pain control.     Neurologic:   - pain control: dilaudid PRN- advance to PO pain control when cleared by vascular surgery   - anxiety: takes Xanax at home, strict NPO per vascular surgery   - hx fibromyalgia, cervical spinal stenosis with chronic pain   - hx brain aneurysm x2- s/p coil x1 20 yrs ago, one monitored without intervention   - hx migraines     Respiratory:   - diaphragmatic injury intraop- repaired. No noted pneumothorax on post op CXR, extubated without issue  wean off of nasal cannula as able. Encourage incentive spirometry   - hx COPD (no home O2, recent exacerbation 11/2019)- Spiriva, breo ellipta and anoro ellipta per patient. Discussed with pharmacy, overlapping effects. Equivalent with Spiriva and Symbicort. Consider changing home med regimen   AM CXR     Cardiovascular:   Normotensive, goal MAP >65. No pressors needed  hx HLD- holding home statin while NPO strict   Cardiac enzymes- neg x3   Received lebatalol 5 IVP x1 for SBP in 180  nondiagnostic stress test 2011; ECHO 5/2012: EF 55-65%, mild MVR, mild TVR,   EKG with    VASC: s/p aorto-SMA bypass, appropriate abdominal incisional tenderness. Soft abdomen, making appropriate urine output - no current concern for abdominal compartment or thrombosis/ hypoperfusion     Gastrointestinal/Nutrition:   NPO except meds w sips and chips  SMA stenosis, s/p bypass- monitor for previously symptomatology of n/v/d  No zofran at this time-   hx gastritis- on omeprazole at home. On Protonix for GI ppx   EGD 11/25/19: nonerosive gastritis, duodenitis, Surgical Pathology Report: negative for H pylori, mild chronic reactive gastropathy   hold bowel regimen at this time    Renal/Genitourinary:   boyd in place, strict I and O monitoring. NS@90 for hyponatremia, Na 136- continue to monitor   AIDE: BUN/Cr 14/1.5 > 1.6  (baseline 0.7)- continue to trend.   Lactate 1.4  Monitor and replete electrolytes as needed.     Hematologic:   Hgb 10.6-> 9.7>8.1 downtrending continue to monitor   DVT ppx:  on SCDs, SQH to start 1/22    Infectious Disease:   On octavio-op Ancef   WBC 16-> 12.2, Afebrile. Monitor for signs of infection     Endocrine:   No diagnosis. Check FSG Q4 while NPO.  in PACU          DVT PTX: heparin  Injectable 5000 Unit(s) SubCutaneous every 8 hours    GI PTX: pantoprazole  Injectable 40 milliGRAM(s) IV Push daily      ***Tubes/Lines/Drains  ***  [x] Peripheral IV  [x] Arterial Line		L radial	  Date Placed: 1/21      [x] Urinary Catheter		Date Placed: 1/21        [x ]A ten-point review of systems was otherwise negative except as noted above.  [ ]Due to altered mental status/intubation, subjective information was not attained from the patient.  History was obtained, to the extent possible, from review of the chart and collateral sources of information. HARI VÁSQUEZ  [id number]  59y Female    Indication for ICU admission:  Admit Date:01-21-20  ICU Date:  OR Date:    No Known Allergies    PAST MEDICAL & SURGICAL HISTORY:  H/O aneurysm: brain x2, 1 s/p coil, one monitored  Fibromyalgia  chronic pain back   Spinal stenosis- cervical   Hypercholesteremia  gastritis   R wrist fracture- pt declined ORIF   COPD (chronic obstructive pulmonary disease)- never intubated for; not on home O2; hospitalized 11/2019   GERD  hyponatremia hx  S/P cerebral aneurysm repair: coil x 20 yrs ago      Home Medications:  albuterol sulfate hfa: 2 puff(s) inhaled 2 times a day (21 Jan 2020 08:29)  Anoro Ellipta 62.5 mcg-25 mcg/inh inhalation powder: 1 puff(s) inhaled once a day (21 Jan 2020 08:29)  atorvastatin 20 qd  omeprazole  oxyCODONE 30 mg oral tablet: 1 tab(s) orally every 6 hours (21 Jan 2020 08:29)  Xanax 1 mg oral tablet: 1 tab(s) orally once a day (21 Jan 2020 08:29)      24HRS EVENT:  Overnight: creatinine improved 1.6-> 1.3. Clarified with vascular- had site biting clamp of aorta- no cross clamp ischemic time for renals  added PO pain regimen, senna  pt had refused inhalers during day, then pm wheeze and tightness- improved with symbicort pm dose given  + coughing. 94% sat on room air  TTP at incision  attempted BM, unable  HTN- improved with pain control.     Neurologic:   - pain control: dilaudid PRN- advance to PO pain control when cleared by vascular surgery   - anxiety: takes Xanax at home, strict NPO per vascular surgery   - hx fibromyalgia, cervical spinal stenosis with chronic pain   - hx brain aneurysm x2- s/p coil x1 20 yrs ago, one monitored without intervention   - hx migraines     Respiratory:   - diaphragmatic injury intraop- repaired. No noted pneumothorax on post op CXR, extubated without issue  wean off of nasal cannula as able. Encourage incentive spirometry   - hx COPD (no home O2, recent exacerbation 11/2019)- Spiriva, breo ellipta and anoro ellipta per patient. Discussed with pharmacy, overlapping effects. Equivalent with Spiriva and Symbicort. Consider changing home med regimen   AM CXR     Cardiovascular:   Normotensive, goal MAP >65. No pressors needed  hx HLD- holding home statin while NPO strict   Cardiac enzymes- neg x3   Received lebatalol 5 IVP x1 for SBP in 180  nondiagnostic stress test 2011; ECHO 5/2012: EF 55-65%, mild MVR, mild TVR,   EKG with    VASC: s/p aorto-SMA bypass, appropriate abdominal incisional tenderness. Soft abdomen, making appropriate urine output - no current concern for abdominal compartment or thrombosis/ hypoperfusion     Gastrointestinal/Nutrition:   NPO except meds w sips and chips  SMA stenosis, s/p bypass- monitor for previously symptomatology of n/v/d  No zofran at this time-   hx gastritis- on omeprazole at home. On Protonix for GI ppx   EGD 11/25/19: nonerosive gastritis, duodenitis, Surgical Pathology Report: negative for H pylori, mild chronic reactive gastropathy   hold bowel regimen at this time    Renal/Genitourinary:   boyd in place, strict I and O monitoring. NS@90 for hyponatremia, Na 136- continue to monitor   AIDE: BUN/Cr 14/1.5 > 1.6  (baseline 0.7)- continue to trend.   Lactate 1.4  Monitor and replete electrolytes as needed.     Hematologic:   Hgb 10.6-> 9.7>8.1 downtrending continue to monitor   DVT ppx:  on SCDs, SQH to start 1/22    Infectious Disease:   On octavio-op Ancef   WBC 16-> 12.2, Afebrile. Monitor for signs of infection     Endocrine:   No diagnosis. Check FSG Q4 while NPO.  in PACU          DVT PTX: heparin  Injectable 5000 Unit(s) SubCutaneous every 8 hours    GI PTX: pantoprazole  Injectable 40 milliGRAM(s) IV Push daily      ***Tubes/Lines/Drains  ***  [x] Peripheral IV  [x] Arterial Line		L radial	  Date Placed: 1/21      [x] Urinary Catheter		Date Placed: 1/21        [x ]A ten-point review of systems was otherwise negative except as noted above.  [ ]Due to altered mental status/intubation, subjective information was not attained from the patient.  History was obtained, to the extent possible, from review of the chart and collateral sources of information.       Daily     Daily     Diet, Clear Liquid (01-23-20 @ 07:20)  Diet, Clear Liquid (01-23-20 @ 07:20)      CURRENT MEDS:  Neurologic Medications  acetaminophen   Tablet .. 650 milliGRAM(s) Oral every 6 hours  oxyCODONE    IR 5 milliGRAM(s) Oral every 4 hours PRN Moderate Pain (4 - 6)  oxyCODONE    IR 10 milliGRAM(s) Oral every 4 hours PRN Severe Pain (7 - 10)    Respiratory Medications  budesonide 160 MICROgram(s)/formoterol 4.5 MICROgram(s) Inhaler 2 Puff(s) Inhalation two times a day  tiotropium 18 MICROgram(s) Capsule 1 Capsule(s) Inhalation daily    Cardiovascular Medications    Gastrointestinal Medications  lactated ringers. 1000 milliLiter(s) IV Continuous <Continuous>  pantoprazole  Injectable 40 milliGRAM(s) IV Push daily  senna 2 Tablet(s) Oral at bedtime    Genitourinary Medications    Hematologic/Oncologic Medications  heparin  Injectable 5000 Unit(s) SubCutaneous every 8 hours    Antimicrobial/Immunologic Medications    Endocrine/Metabolic Medications    Topical/Other Medications  chlorhexidine 4% Liquid 1 Application(s) Topical <User Schedule>      ICU Vital Signs Last 24 Hrs  T(C): 36.9 (23 Jan 2020 06:00), Max: 37.1 (22 Jan 2020 08:05)  T(F): 98.4 (23 Jan 2020 06:00), Max: 98.8 (22 Jan 2020 08:05)  HR: 62 (23 Jan 2020 07:00) (62 - 96)  BP: 118/72 (23 Jan 2020 00:00) (112/53 - 131/60)  BP(mean): 85 (22 Jan 2020 16:00) (76 - 85)  ABP: 151/53 (23 Jan 2020 07:00) (118/54 - 183/64)  ABP(mean): 83 (22 Jan 2020 21:00) (79 - 97)  RR: 18 (23 Jan 2020 07:00) (15 - 30)  SpO2: 94% (23 Jan 2020 07:00) (92% - 98%)          I&O's Summary    22 Jan 2020 07:01  -  23 Jan 2020 07:00  --------------------------------------------------------  IN: 2235 mL / OUT: 1245 mL / NET: 990 mL      I&O's Detail    22 Jan 2020 07:01  -  23 Jan 2020 07:00  --------------------------------------------------------  IN:    IV PiggyBack: 75 mL    lactated ringers.: 720 mL    sodium chloride 0.9%: 1440 mL  Total IN: 2235 mL    OUT:    Indwelling Catheter - Urethral: 1245 mL  Total OUT: 1245 mL    Total NET: 990 mL          PHYSICAL EXAM:    General/Neuro  RASS:   0          GCS: 15   Deficits:  alert & oriented x 3, no focal deficits. RUIZ,       Lungs:  intermittently on nasal cannula, clear to auscultatio, fair aeration.  Normal expansion/effort.     Cardiovascular : S1, S2.  Regular rate and rhythm. No noted peripheral edema  Cardiac Rhythm: Normal Sinus Rhythm    GI: Abdomen soft, periincisional tenderness, Non-distended.    Wound: left horizontal incision left abdomen, dressing c/d/i     Extremities: Extremities warm, pink, well-perfused.  RUIZ, SCDs in place. L radial a-line    Derm: Good skin turgor, no skin breakdown.      :       Boyd catheter in place.      CXR:     LABS:  CAPILLARY BLOOD GLUCOSE      POCT Blood Glucose.: 116 mg/dL (23 Jan 2020 04:12)  POCT Blood Glucose.: 116 mg/dL (22 Jan 2020 23:58)  POCT Blood Glucose.: 116 mg/dL (22 Jan 2020 22:14)  POCT Blood Glucose.: 111 mg/dL (22 Jan 2020 18:25)  POCT Blood Glucose.: 106 mg/dL (22 Jan 2020 14:29)  POCT Blood Glucose.: 113 mg/dL (22 Jan 2020 10:16)                          8.2    11.32 )-----------( 182      ( 22 Jan 2020 22:48 )             24.6       01-22    138  |  107  |  17  ----------------------------<  112<H>  4.2   |  19  |  1.3    Ca    7.6<L>      22 Jan 2020 22:48  Phos  4.1     01-22  Mg     2.2     01-22        PT/INR - ( 22 Jan 2020 22:48 )   PT: 11.10 sec;   INR: 0.96 ratio         PTT - ( 22 Jan 2020 22:48 )  PTT:28.4 sec  CARDIAC MARKERS ( 22 Jan 2020 10:45 )  x     / x     / 1096 U/L / x     / x      CARDIAC MARKERS ( 22 Jan 2020 08:00 )  x     / <0.01 ng/mL / 1099 U/L / x     / 18.3 ng/mL  CARDIAC MARKERS ( 22 Jan 2020 02:00 )  x     / <0.01 ng/mL / 1015 U/L / x     / 19.1 ng/mL  CARDIAC MARKERS ( 21 Jan 2020 19:50 )  x     / <0.01 ng/mL / 674 U/L / x     / 11.1 ng/mL

## 2020-01-24 DIAGNOSIS — R10.9 UNSPECIFIED ABDOMINAL PAIN: ICD-10-CM

## 2020-01-24 LAB
ANION GAP SERPL CALC-SCNC: 11 MMOL/L — SIGNIFICANT CHANGE UP (ref 7–14)
BUN SERPL-MCNC: 13 MG/DL — SIGNIFICANT CHANGE UP (ref 10–20)
CALCIUM SERPL-MCNC: 8.1 MG/DL — LOW (ref 8.5–10.1)
CHLORIDE SERPL-SCNC: 105 MMOL/L — SIGNIFICANT CHANGE UP (ref 98–110)
CO2 SERPL-SCNC: 23 MMOL/L — SIGNIFICANT CHANGE UP (ref 17–32)
CREAT SERPL-MCNC: 1 MG/DL — SIGNIFICANT CHANGE UP (ref 0.7–1.5)
GLUCOSE SERPL-MCNC: 88 MG/DL — SIGNIFICANT CHANGE UP (ref 70–99)
HCT VFR BLD CALC: 22.9 % — LOW (ref 37–47)
HGB BLD-MCNC: 7.5 G/DL — LOW (ref 12–16)
MAGNESIUM SERPL-MCNC: 2.2 MG/DL — SIGNIFICANT CHANGE UP (ref 1.8–2.4)
MCHC RBC-ENTMCNC: 32.3 PG — HIGH (ref 27–31)
MCHC RBC-ENTMCNC: 32.8 G/DL — SIGNIFICANT CHANGE UP (ref 32–37)
MCV RBC AUTO: 98.7 FL — SIGNIFICANT CHANGE UP (ref 81–99)
NRBC # BLD: 0 /100 WBCS — SIGNIFICANT CHANGE UP (ref 0–0)
PHOSPHATE SERPL-MCNC: 2.9 MG/DL — SIGNIFICANT CHANGE UP (ref 2.1–4.9)
PLATELET # BLD AUTO: 177 K/UL — SIGNIFICANT CHANGE UP (ref 130–400)
POTASSIUM SERPL-MCNC: 4.4 MMOL/L — SIGNIFICANT CHANGE UP (ref 3.5–5)
POTASSIUM SERPL-SCNC: 4.4 MMOL/L — SIGNIFICANT CHANGE UP (ref 3.5–5)
RBC # BLD: 2.32 M/UL — LOW (ref 4.2–5.4)
RBC # FLD: 13.8 % — SIGNIFICANT CHANGE UP (ref 11.5–14.5)
SODIUM SERPL-SCNC: 139 MMOL/L — SIGNIFICANT CHANGE UP (ref 135–146)
WBC # BLD: 7.79 K/UL — SIGNIFICANT CHANGE UP (ref 4.8–10.8)
WBC # FLD AUTO: 7.79 K/UL — SIGNIFICANT CHANGE UP (ref 4.8–10.8)

## 2020-01-24 PROCEDURE — 99024 POSTOP FOLLOW-UP VISIT: CPT

## 2020-01-24 PROCEDURE — 71045 X-RAY EXAM CHEST 1 VIEW: CPT | Mod: 26

## 2020-01-24 RX ORDER — OXYCODONE HYDROCHLORIDE 5 MG/1
30 TABLET ORAL EVERY 6 HOURS
Refills: 0 | Status: DISCONTINUED | OUTPATIENT
Start: 2020-01-24 | End: 2020-01-28

## 2020-01-24 RX ADMIN — Medication 1 MILLIGRAM(S): at 06:38

## 2020-01-24 RX ADMIN — SENNA PLUS 2 TABLET(S): 8.6 TABLET ORAL at 21:55

## 2020-01-24 RX ADMIN — OXYCODONE HYDROCHLORIDE 30 MILLIGRAM(S): 5 TABLET ORAL at 12:19

## 2020-01-24 RX ADMIN — PANTOPRAZOLE SODIUM 40 MILLIGRAM(S): 20 TABLET, DELAYED RELEASE ORAL at 11:34

## 2020-01-24 RX ADMIN — OXYCODONE HYDROCHLORIDE 30 MILLIGRAM(S): 5 TABLET ORAL at 23:19

## 2020-01-24 RX ADMIN — Medication 650 MILLIGRAM(S): at 18:30

## 2020-01-24 RX ADMIN — BUDESONIDE AND FORMOTEROL FUMARATE DIHYDRATE 2 PUFF(S): 160; 4.5 AEROSOL RESPIRATORY (INHALATION) at 21:57

## 2020-01-24 RX ADMIN — HEPARIN SODIUM 5000 UNIT(S): 5000 INJECTION INTRAVENOUS; SUBCUTANEOUS at 05:31

## 2020-01-24 RX ADMIN — HEPARIN SODIUM 5000 UNIT(S): 5000 INJECTION INTRAVENOUS; SUBCUTANEOUS at 21:56

## 2020-01-24 RX ADMIN — ATORVASTATIN CALCIUM 20 MILLIGRAM(S): 80 TABLET, FILM COATED ORAL at 21:55

## 2020-01-24 RX ADMIN — Medication 650 MILLIGRAM(S): at 05:31

## 2020-01-24 RX ADMIN — OXYCODONE HYDROCHLORIDE 10 MILLIGRAM(S): 5 TABLET ORAL at 07:00

## 2020-01-24 RX ADMIN — OXYCODONE HYDROCHLORIDE 30 MILLIGRAM(S): 5 TABLET ORAL at 17:37

## 2020-01-24 RX ADMIN — Medication 650 MILLIGRAM(S): at 23:19

## 2020-01-24 RX ADMIN — Medication 650 MILLIGRAM(S): at 17:38

## 2020-01-24 RX ADMIN — Medication 650 MILLIGRAM(S): at 12:04

## 2020-01-24 RX ADMIN — OXYCODONE HYDROCHLORIDE 10 MILLIGRAM(S): 5 TABLET ORAL at 06:38

## 2020-01-24 RX ADMIN — OXYCODONE HYDROCHLORIDE 30 MILLIGRAM(S): 5 TABLET ORAL at 18:30

## 2020-01-24 RX ADMIN — BUDESONIDE AND FORMOTEROL FUMARATE DIHYDRATE 2 PUFF(S): 160; 4.5 AEROSOL RESPIRATORY (INHALATION) at 07:26

## 2020-01-24 RX ADMIN — OXYCODONE HYDROCHLORIDE 30 MILLIGRAM(S): 5 TABLET ORAL at 11:49

## 2020-01-24 RX ADMIN — Medication 650 MILLIGRAM(S): at 11:34

## 2020-01-24 RX ADMIN — OXYCODONE HYDROCHLORIDE 10 MILLIGRAM(S): 5 TABLET ORAL at 01:18

## 2020-01-24 RX ADMIN — HEPARIN SODIUM 5000 UNIT(S): 5000 INJECTION INTRAVENOUS; SUBCUTANEOUS at 13:37

## 2020-01-24 RX ADMIN — Medication 650 MILLIGRAM(S): at 05:29

## 2020-01-24 RX ADMIN — CHLORHEXIDINE GLUCONATE 1 APPLICATION(S): 213 SOLUTION TOPICAL at 05:30

## 2020-01-24 RX ADMIN — OXYCODONE HYDROCHLORIDE 10 MILLIGRAM(S): 5 TABLET ORAL at 01:40

## 2020-01-24 NOTE — PROGRESS NOTE ADULT - SUBJECTIVE AND OBJECTIVE BOX
GENERAL SURGERY PROGRESS NOTE     HARI VÁSQUEZ  59y  Female  Hospital day: 4  POD: 3  Procedure: Bypass of superior mesenteric artery    OVERNIGHT EVENTS: No acute events overnight    T(F): 97.4 (01-24-20 @ 00:00), Max: 98.6 (01-23-20 @ 08:00)  HR: 79 (01-24-20 @ 00:00) (62 - 84)  BP: 128/60 (01-24-20 @ 00:00) (119/57 - 133/68)  ABP: 151/53 (01-23-20 @ 07:00) (151/53 - 151/53)  RR: 20 (01-24-20 @ 00:00) (18 - 20)  SpO2: 97% (01-23-20 @ 14:45) (94% - 98%)    URINE:   01-22-20 @ 07:01  -  01-23-20 @ 07:00  --------------------------------------------------------  OUT: 1245 mL     GI proph:  pantoprazole  Injectable 40 milliGRAM(s) IV Push daily    AC/ proph: heparin  Injectable 5000 Unit(s) SubCutaneous every 8 hours    PHYSICAL EXAM:  General: Alert and oriented times 3, not in acute distress   Heart: Regular rate and rhythm, no rubs , murmurs or gallops  Lungs: Clear to auscultation bilaterally, no wheezes, rales, rhonchi appreciated  Abdomen: Soft , positive bowel sounds, no tenderness, no distention, no peritoneal signs, incision clean dry and intact   Extremities: warm extremities,  good color, no swelling, motor and sensation , pulses     LABS    POCT Blood Glucose.: 123 mg/dL (23 Jan 2020 17:50)  POCT Blood Glucose.: 121 mg/dL (23 Jan 2020 14:23)  POCT Blood Glucose.: 86 mg/dL (23 Jan 2020 08:15)                          8.2    11.32 )-----------( 182      ( 22 Jan 2020 22:48 )             24.6         01-22    138  |  107  |  17  ----------------------------<  112<H>  4.2   |  19  |  1.3    Coags:     11.10  ----< 0.96    ( 22 Jan 2020 22:48 )     28.4        CARDIAC MARKERS ( 22 Jan 2020 10:45 )  x     / x     / 1096 U/L / x     / x      CARDIAC MARKERS ( 22 Jan 2020 08:00 )  x     / <0.01 ng/mL / 1099 U/L / x     / 18.3 ng/mL    RADIOLOGY & ADDITIONAL TESTS:  < from: Xray Chest 1 View- PORTABLE-Routine (01.23.20 @ 04:50) >  Impression:    Left lower lobe opacification and effusion.  Worsening.

## 2020-01-24 NOTE — CONSULT NOTE ADULT - SUBJECTIVE AND OBJECTIVE BOX
HPI: 58 yo F admitted on 1/21 for mesenteric ischemia, s/p aorta-supramesenteric artery bypass. Prior to hospitalization she was ambulating independent. Post - op rehab consulted for eval      PAST MEDICAL & SURGICAL HISTORY:  H/O aneurysm: brain  SOB (shortness of breath)  Fibromyalgia  Spinal stenosis  Hypercholesteremia  COPD (chronic obstructive pulmonary disease)  S/P cerebral aneurysm repair: coil x 20 yrs ago      Hospital Course:    TODAY'S SUBJECTIVE & REVIEW OF SYMPTOMS:     Constitutional WNL   Cardio WNL   Resp WNL   GI WNL  Heme WNL  Endo WNL  Skin WNL  MSK pain  Neuro WNL  Cognitive WNL  Psych WNL      MEDICATIONS  (STANDING):  acetaminophen   Tablet .. 650 milliGRAM(s) Oral every 6 hours  atorvastatin 20 milliGRAM(s) Oral at bedtime  budesonide 160 MICROgram(s)/formoterol 4.5 MICROgram(s) Inhaler 2 Puff(s) Inhalation two times a day  chlorhexidine 4% Liquid 1 Application(s) Topical <User Schedule>  heparin  Injectable 5000 Unit(s) SubCutaneous every 8 hours  pantoprazole  Injectable 40 milliGRAM(s) IV Push daily  senna 2 Tablet(s) Oral at bedtime  tiotropium 18 MICROgram(s) Capsule 1 Capsule(s) Inhalation daily    MEDICATIONS  (PRN):  ALPRAZolam 1 milliGRAM(s) Oral daily PRN Anxiety  oxyCODONE    IR 5 milliGRAM(s) Oral every 4 hours PRN Moderate Pain (4 - 6)  oxyCODONE    IR 10 milliGRAM(s) Oral every 4 hours PRN Severe Pain (7 - 10)      FAMILY HISTORY:  FH: pancreatic cancer: uncle  FHx: breast cancer: grandmother, aunt  Family history of myocardial infarction in first degree male relative of known age: &gt; 61 yo  Family hx of lung cancer: father      Allergies    No Known Allergies    Intolerances        SOCIAL HISTORY:    [  ] Etoh  [  ] Smoking  [  ] Substance abuse     Home Environment:  [  ] Home Alone  [ x ] Lives with Family  [  ] Home Health Aid    Dwelling:  [ x ] Apartment  [  ] Private House  [  ] Adult Home  [  ] Skilled Nursing Facility      [  ] Short Term  [  ] Long Term  [  ] Stairs       Elevator [  ]    FUNCTIONAL STATUS PTA: (Check all that apply)  Ambulation: [ x  ]Independent    [  ] Dependent     [  ] Non-Ambulatory  Assistive Device: [  ] SA Cane  [  ]  Q Cane  [  ] Walker  [  ]  Wheelchair  ADL : [ x ] Independent  [  ]  Dependent       Vital Signs Last 24 Hrs  T(C): 30.9 (24 Jan 2020 07:16), Max: 36.8 (23 Jan 2020 19:36)  T(F): 87.7 (24 Jan 2020 07:16), Max: 98.2 (23 Jan 2020 19:36)  HR: 72 (24 Jan 2020 07:16) (72 - 84)  BP: 112/54 (24 Jan 2020 07:16) (112/54 - 128/60)  BP(mean): --  RR: 20 (24 Jan 2020 07:16) (18 - 20)  SpO2: 97% (23 Jan 2020 14:45) (97% - 97%)      PHYSICAL EXAM: Alert & Oriented X3  GENERAL: NAD, well-groomed, well-developed  HEAD:  Atraumatic, Normocephalic  CHEST/LUNG: Clear   HEART: S1S2+  ABDOMEN: Soft, Nontender  EXTREMITIES:  no calf tenderness    NERVOUS SYSTEM:  Cranial Nerves 2-12 intact [  ] Abnormal  [  ]  ROM: WFL all extremities [ x ]  Abnormal [  ]  Motor Strength: WFL all extremities  [x  ]  Abnormal [  ]  Sensation: intact to light touch [ x ] Abnormal [  ]  Reflexes: Symmetric [  ]  Abnormal [  ]    FUNCTIONAL STATUS:  Bed Mobility: Independent [  ]  Supervision [  ]  Needs Assistance [x  ]  N/A [  ]  Transfers: Independent [  ]  Supervision [  ]  Needs Assistance [x  ]  N/A [  ]   Ambulation: Independent [  ]  Supervision [  ]  Needs Assistance [  ]  N/A [  ]  ADL: Independent [  ] Requires Assistance [  ] N/A [  ]      LABS:                        7.5    7.79  )-----------( 177      ( 24 Jan 2020 05:49 )             22.9     01-24    139  |  105  |  13  ----------------------------<  88  4.4   |  23  |  1.0    Ca    8.1<L>      24 Jan 2020 05:49  Phos  2.9     01-24  Mg     2.2     01-24      PT/INR - ( 22 Jan 2020 22:48 )   PT: 11.10 sec;   INR: 0.96 ratio         PTT - ( 22 Jan 2020 22:48 )  PTT:28.4 sec      RADIOLOGY & ADDITIONAL STUDIES:    Assesment:

## 2020-01-24 NOTE — CONSULT NOTE ADULT - PROBLEM SELECTOR RECOMMENDATION 9
Con't acetaminophen   Tablet .. 650 milliGRAM(s) Oral every 6 hours  Con't ALPRAZolam 1 milliGRAM(s) Oral daily PRN  DC oxyCODONE    IR 5 milliGRAM(s) Oral every 4 hours PRN  Change oxyCODONE    IR 30 milliGRAM(s) Oral every 6 hours PRN  Toradol 15mg IVP Q6hrs PRN

## 2020-01-24 NOTE — CONSULT NOTE ADULT - ASSESSMENT
IMPRESSION: Rehab of gait dysfunction    PRECAUTIONS: [  ] Cardiac  [  ] Respiratory  [  ] Seizures [  ] Contact Isolation  [  ] Droplet Isolation  [  ] Other    Weight Bearing Status:     RECOMMENDATION:    Out of Bed to Chair     DVT/Decubiti Prophylaxis    REHAB PLAN:     [ x  ] Bedside P/T 3-5 times a week   [   ]   Bedside O/T  2-3 times a week             [   ] No Rehab Therapy Indicated                   [   ]  Speech Therapy   Conditioning/ROM                                    ADL  Bed Mobility                                               Conditioning/ROM  Transfers                                                     Bed Mobility  Sitting /Standing Balance                         Transfers                                        Gait Training                                               Sitting/Standing Balance  Stair Training [   ]Applicable                    Home equipment Eval                                                                        Splinting  [   ] Only      GOALS:   ADL   [   ]   Independent                    Transfers  [ x  ] Independent                          Ambulation  [  x ] Independent     [   x ] With device                            [   ]  CG                                                         [   ]  CG                                                                  [   ] CG                            [    ] Min A                                                   [   ] Min A                                                              [   ] Min  A          DISCHARGE PLAN:   [   ]  Good candidate for Intensive Rehabilitation/Hospital based                                             Will tolerate 3hrs Intensive Rehab Daily                                       [    ]  Short Term Rehab in Skilled Nursing Facility                                       [ x   ]  Home with Outpatient or  services                                         [    ]  Possible Candidate for Intensive Hospital based Rehab

## 2020-01-24 NOTE — PROGRESS NOTE ADULT - ASSESSMENT
59F s/p SMA bypass    -Hemodynamically stable  -PT/Rehab   -CLD, advance diet as tolerated   -Adequate pain control   -OOB to chair  -f/u CR and CK

## 2020-01-24 NOTE — CONSULT NOTE ADULT - SUBJECTIVE AND OBJECTIVE BOX
Chief Complaint:       59y Female HPI: Spinal stenosis, FM, Arthritis and COPD-Emphysema. Patient with complaint of body, worse pain at this time to the abd at the surgical site. Patient with diagnosis of superior mesenteric artery stenosis and is s/p bypass of superior mesenteric artery 21-Jan-2020.        Allergies    No Known Allergies    Intolerances        PAST MEDICAL & SURGICAL HISTORY:  H/O aneurysm: brain  SOB (shortness of breath)  Fibromyalgia  Spinal stenosis  Hypercholesteremia  COPD (chronic obstructive pulmonary disease)  S/P cerebral aneurysm repair: coil x 20 yrs ago        SOCIAL HISTORY:  Denies Smoking, Alcohol, or Drug Use    No Known Allergies      PAIN MEDICATIONS:  acetaminophen   Tablet .. 650 milliGRAM(s) Oral every 6 hours  ALPRAZolam 1 milliGRAM(s) Oral daily PRN  oxyCODONE    IR 5 milliGRAM(s) Oral every 4 hours PRN  oxyCODONE    IR 30 milliGRAM(s) Oral every 6 hours    Heme:  heparin  Injectable 5000 Unit(s) SubCutaneous every 8 hours    Antibiotics:    Cardiovascular:    GI:  pantoprazole  Injectable 40 milliGRAM(s) IV Push daily  senna 2 Tablet(s) Oral at bedtime    Endocrine:  atorvastatin 20 milliGRAM(s) Oral at bedtime    All Other Medications:  chlorhexidine 4% Liquid 1 Application(s) Topical <User Schedule>      Vital Signs Last 24 Hrs  T(C): 36.3 (24 Jan 2020 15:00), Max: 36.8 (23 Jan 2020 19:36)  T(F): 97.4 (24 Jan 2020 15:00), Max: 98.2 (23 Jan 2020 19:36)  HR: 79 (24 Jan 2020 15:00) (72 - 84)  BP: 115/53 (24 Jan 2020 15:00) (112/54 - 128/60)  BP(mean): --  RR: 19 (24 Jan 2020 15:00) (18 - 20)  SpO2: --      01-23-20 @ 07:01  -  01-24-20 @ 07:00  --------------------------------------------------------  IN: 90 mL / OUT: 525 mL / NET: -435 mL    01-24-20 @ 07:01 - 01-24-20 @ 16:10  --------------------------------------------------------  IN: 600 mL / OUT: 400 mL / NET: 200 mL        LABS:                          7.5    7.79  )-----------( 177      ( 24 Jan 2020 05:49 )             22.9     01-24    139  |  105  |  13  ----------------------------<  88  4.4   |  23  |  1.0    Ca    8.1<L>      24 Jan 2020 05:49  Phos  2.9     01-24  Mg     2.2     01-24      PT/INR - ( 22 Jan 2020 22:48 )   PT: 11.10 sec;   INR: 0.96 ratio         PTT - ( 22 Jan 2020 22:48 )  PTT:28.4 sec      RADIOLOGY:  ACCESSION No:  [id number]    TIFFANY VÁSQUEZ                    2        Surgical Final Report          Final Diagnosis  1. Duodenum, biopsy:  - Duodenal mucosa with preserved villous architecture and  mild chronic nonspecific inflammation.    2. Antrum, biopsy:  - Fragments of antral type gastric mucosa with mild chronic  nonspecific inflammation and reactive gastropathy.  - Giemsa stain is negative for Helicobacter organisms.    3. Body, biopsy:  - Fragments of body type gastric mucosa with mild chronic  nonspecific inflammation.  - Giemsa stain is negative for Helicobacter organisms.    4. Fundus, biopsy:  - Gastric mucosa with mild chronic nonspecific inflammation.  - Giemsa stain is negative for Helicobacter organisms.    Verified by: Tahira Garnett M.D.  (Electronic Signature)  Reported on: 11/26/19 13:26 EST, [facility], [address]rd Floor,  [place] [address]  Histology technical processing performed at [address] [place] [address]  Phone: [phone]   Fax: [phone]  _________________________________________________________________    Clinical History  EGD    Specimen(s) Submitted  1     Duodenum biopsy  2     Antrum biopsy  3     Body biopsy  4     Fundus biopsy    Gross Description  1. The specimen is received in formalin, labeled "duodenum" and  consists of four fragments of tan soft tissue, 0.4 to 0.7 cm in  greatest dimension.  The specimen is submitted entirely.  (1 block)    2. The specimen is received in formalin, labeled "antrum" and  consists of four fragments of tan soft tissue, 0.4 to 0.6 cm in  greatest dimension.  The specimen is submitted entirely.  (1 block)              HUABEBOCHESTER TIFFANY                    2        Surgical Final Report          3. The specimen is received in formalin, labeled "body" and  consists of two fragments of tan soft tissue, 0.3 and 0.6 cm in  greatest dimension.  The specimen is submitted entirely.  (1 block)    4. The specimen is received in formalin, labeled "fundus" and  consists of two fragments of tan soft tissue, 0.4 and 0.5 cm in  greatest dimension.  The specimen is submitted entirely.  (1 block)    Specimen was received and underwent gross examination at [facility], [address] [place] [address].    11/26/19 06:54 km    Perioperative Diagnosis  Abdominal pain, gastritis    Postoperative Diagnosis  Non-erosive gastritis          Drug Screen:        [ ]  [place]  Reviewed on 1/24/20, 4:10P  Patient Name:	Tiffany Anderson	YOB: 1960  Address:	[address] [place] [address] [address] [place] [address]	Sex:	Female  Rx Written	Rx Dispensed	Drug	Quantity	Days Supply	Prescriber Name  01/15/2020	01/16/2020	alprazolam 1 mg tablet	60	30	Michelle Trinh MD  01/06/2020	01/06/2020	oxycodone hcl 30 mg tablet	180	30	Jf Melendez MD  12/19/2019	12/19/2019	alprazolam 1 mg tablet	60	30	Michelle Trinh MD  12/05/2019	12/06/2019	alprazolam 1 mg tablet	30	30	fJ Melendez MD  12/06/2019	12/06/2019	oxycodone hcl 30 mg tablet	180	30	Jf Melendez MD  11/07/2019	11/08/2019	alprazolam 1 mg tablet	30	30	Jf Melendez MD  11/07/2019	11/08/2019	oxycodone hcl 30 mg tablet	180	30	Melendez, Jf BLACKBURN MD  10/11/2019	10/11/2019	alprazolam 1 mg tablet	30	30	Melendez, Jf BLACKBURN MD  10/11/2019	10/11/2019	oxycodone hcl 30 mg tablet	180	30	Melendez, Jf BLACKBURN MD  09/12/2019	09/13/2019	oxycodone hcl 30 mg tablet	180	30	Melendez, Jf BLACKBURN MD  09/12/2019	09/13/2019	alprazolam 1 mg tablet	30	30	Melendez, Jf BLACKBURN MD  08/13/2019	08/14/2019	alprazolam 1 mg tablet	30	30	Melendez, Jf BLACKBURN MD  08/13/2019	08/14/2019	oxycodone hcl 30 mg tablet	180	30	Melendez, Jf BLACKBURN MD  07/16/2019	07/17/2019	oxycodone hcl 30 mg tablet	180	30	Melendez, Jf BLACKBURN MD  07/16/2019	07/17/2019	alprazolam 1 mg tablet	30	30	Melendez, Jf BLACKBURN MD  06/18/2019	06/19/2019	alprazolam 1 mg tablet	30	30	Melendez, Jf BLACKBURN MD  06/18/2019	06/19/2019	oxycodone hcl 30 mg tablet	180	30	Melendez, Jf BLACKBURN MD  05/20/2019	05/20/2019	oxycodone hcl 30 mg tablet	180	30	Melendez, Jf BLACKBURN MD  05/20/2019	05/20/2019	alprazolam 1 mg tablet	30	30	Melendez, fJ BLACKBURN MD  04/20/2019	04/20/2019	oxycodone hcl 30 mg tablet	180	30	Melendez, Jf BLACKBURN MD  04/20/2019	04/20/2019	alprazolam 1 mg tablet	30	30	Melendez, Jf BLACKBURN MD  03/22/2019	03/23/2019	oxycodone hcl 30 mg tablet	180	30	Melendez, Jf BLACKBURN MD  03/22/2019	03/23/2019	alprazolam 1 mg tablet	30	30	Melendez, Jf BLACKBURN MD  02/22/2019	02/23/2019	oxycodone hcl 30 mg tablet	180	30	Melendez, Jf BLACKBURN MD  02/22/2019	02/23/2019	alprazolam 1 mg tablet	30	30	Melendez, Jf BLACKBURN MD  01/25/2019	01/26/2019	alprazolam 1 mg tablet	30	30	Melendez, Jf BLACKBURN MD  01/25/2019	01/26/2019	oxycodone hcl 30 mg tablet	180	30	Jf Melendez MD

## 2020-01-24 NOTE — CONSULT NOTE ADULT - ASSESSMENT
REVIEW OF SYSTEMS    General:	NAD   Skin/Breast:	Neg  Ophthalmologic:	Neg  ENMT: Neg	  Respiratory and Thorax: COPD with O2 NC in place. Neg Resp distress  Cardiovascular:	mesenteric artery stenosis with surgical repair  Gastrointestinal:	Neg  Genitourinary:	Neg  Musculoskeletal:chronic spinal stenosis  Neurological:	Fibromyalgia  Psychiatric:	Neg  Hematology/Lymphatics:	Neg  Endocrine:	Neg        PHYSICAL EXAM:    GENERAL: NAD, well-groomed, well-developed  HEAD:  Atraumatic, Normocephalic  EYES: EOMI, PERRLA, conjunctiva and sclera clear  ENMT: No tonsillar erythema, exudates, or enlargement; Moist mucous membranes, Good dentition, No lesions  NECK: Supple, No JVD, Normal thyroid  NERVOUS SYSTEM:  Alert & Oriented X3, Good concentration; Motor Strength 5/5 B/L upper and lower extremities  CHEST/LUNG: Rhonci bilaterally  HEART: Regular rate and rhythm; No murmurs, rubs, or gallops  ABDOMEN: Soft, mild tenderness to left side abd at site of surgery, Nondistended; Bowel sounds present  EXTREMITIES:  2+ Peripheral Pulses, No clubbing, cyanosis, or edema  LYMPH: No lymphadenopathy noted  SKIN: No rashes or lesions      Patient sitting to chair. Patient with surgical scar to the mid abd to the left lateral side. Steri-strips in place, dry and intact. Neg s/s of infection. Area without redness.  Patient states pain is 10/10 before pain meds and  8/10 after pain meds. Pain is  8/10 now. Pain is described as achy/dull/pulsating, constant, non-radiating. Patient states she takes opioids at home for chronic back pain secondary to spinal stenosis. Movement and sensation present to all ext's.

## 2020-01-25 LAB
ANION GAP SERPL CALC-SCNC: 14 MMOL/L — SIGNIFICANT CHANGE UP (ref 7–14)
BUN SERPL-MCNC: 8 MG/DL — LOW (ref 10–20)
CALCIUM SERPL-MCNC: 8.5 MG/DL — SIGNIFICANT CHANGE UP (ref 8.5–10.1)
CHLORIDE SERPL-SCNC: 105 MMOL/L — SIGNIFICANT CHANGE UP (ref 98–110)
CO2 SERPL-SCNC: 22 MMOL/L — SIGNIFICANT CHANGE UP (ref 17–32)
CREAT SERPL-MCNC: 0.9 MG/DL — SIGNIFICANT CHANGE UP (ref 0.7–1.5)
GLUCOSE BLDC GLUCOMTR-MCNC: 123 MG/DL — HIGH (ref 70–99)
GLUCOSE SERPL-MCNC: 107 MG/DL — HIGH (ref 70–99)
HCT VFR BLD CALC: 22.8 % — LOW (ref 37–47)
HGB BLD-MCNC: 7.2 G/DL — LOW (ref 12–16)
MAGNESIUM SERPL-MCNC: 2 MG/DL — SIGNIFICANT CHANGE UP (ref 1.8–2.4)
MCHC RBC-ENTMCNC: 30.9 PG — SIGNIFICANT CHANGE UP (ref 27–31)
MCHC RBC-ENTMCNC: 31.6 G/DL — LOW (ref 32–37)
MCV RBC AUTO: 97.9 FL — SIGNIFICANT CHANGE UP (ref 81–99)
NRBC # BLD: 0 /100 WBCS — SIGNIFICANT CHANGE UP (ref 0–0)
PHOSPHATE SERPL-MCNC: 3.4 MG/DL — SIGNIFICANT CHANGE UP (ref 2.1–4.9)
PLATELET # BLD AUTO: 190 K/UL — SIGNIFICANT CHANGE UP (ref 130–400)
POTASSIUM SERPL-MCNC: 4 MMOL/L — SIGNIFICANT CHANGE UP (ref 3.5–5)
POTASSIUM SERPL-SCNC: 4 MMOL/L — SIGNIFICANT CHANGE UP (ref 3.5–5)
RBC # BLD: 2.33 M/UL — LOW (ref 4.2–5.4)
RBC # FLD: 13.7 % — SIGNIFICANT CHANGE UP (ref 11.5–14.5)
SODIUM SERPL-SCNC: 141 MMOL/L — SIGNIFICANT CHANGE UP (ref 135–146)
WBC # BLD: 6.08 K/UL — SIGNIFICANT CHANGE UP (ref 4.8–10.8)
WBC # FLD AUTO: 6.08 K/UL — SIGNIFICANT CHANGE UP (ref 4.8–10.8)

## 2020-01-25 PROCEDURE — 99024 POSTOP FOLLOW-UP VISIT: CPT

## 2020-01-25 RX ORDER — MINERAL OIL
30 OIL (ML) MISCELLANEOUS ONCE
Refills: 0 | Status: COMPLETED | OUTPATIENT
Start: 2020-01-25 | End: 2020-01-25

## 2020-01-25 RX ORDER — MINERAL OIL
30 OIL (ML) MISCELLANEOUS DAILY
Refills: 0 | Status: DISCONTINUED | OUTPATIENT
Start: 2020-01-25 | End: 2020-01-28

## 2020-01-25 RX ADMIN — Medication 30 MILLILITER(S): at 19:06

## 2020-01-25 RX ADMIN — OXYCODONE HYDROCHLORIDE 30 MILLIGRAM(S): 5 TABLET ORAL at 05:57

## 2020-01-25 RX ADMIN — Medication 650 MILLIGRAM(S): at 17:28

## 2020-01-25 RX ADMIN — Medication 650 MILLIGRAM(S): at 23:03

## 2020-01-25 RX ADMIN — HEPARIN SODIUM 5000 UNIT(S): 5000 INJECTION INTRAVENOUS; SUBCUTANEOUS at 21:06

## 2020-01-25 RX ADMIN — Medication 650 MILLIGRAM(S): at 18:20

## 2020-01-25 RX ADMIN — OXYCODONE HYDROCHLORIDE 30 MILLIGRAM(S): 5 TABLET ORAL at 23:07

## 2020-01-25 RX ADMIN — OXYCODONE HYDROCHLORIDE 30 MILLIGRAM(S): 5 TABLET ORAL at 12:33

## 2020-01-25 RX ADMIN — HEPARIN SODIUM 5000 UNIT(S): 5000 INJECTION INTRAVENOUS; SUBCUTANEOUS at 13:36

## 2020-01-25 RX ADMIN — HEPARIN SODIUM 5000 UNIT(S): 5000 INJECTION INTRAVENOUS; SUBCUTANEOUS at 05:57

## 2020-01-25 RX ADMIN — Medication 1 MILLIGRAM(S): at 01:38

## 2020-01-25 RX ADMIN — ATORVASTATIN CALCIUM 20 MILLIGRAM(S): 80 TABLET, FILM COATED ORAL at 21:05

## 2020-01-25 RX ADMIN — Medication 10 MILLIGRAM(S): at 17:57

## 2020-01-25 RX ADMIN — OXYCODONE HYDROCHLORIDE 30 MILLIGRAM(S): 5 TABLET ORAL at 17:28

## 2020-01-25 RX ADMIN — OXYCODONE HYDROCHLORIDE 30 MILLIGRAM(S): 5 TABLET ORAL at 23:03

## 2020-01-25 RX ADMIN — PANTOPRAZOLE SODIUM 40 MILLIGRAM(S): 20 TABLET, DELAYED RELEASE ORAL at 12:06

## 2020-01-25 RX ADMIN — OXYCODONE HYDROCHLORIDE 30 MILLIGRAM(S): 5 TABLET ORAL at 12:03

## 2020-01-25 RX ADMIN — TIOTROPIUM BROMIDE 1 CAPSULE(S): 18 CAPSULE ORAL; RESPIRATORY (INHALATION) at 09:16

## 2020-01-25 RX ADMIN — BUDESONIDE AND FORMOTEROL FUMARATE DIHYDRATE 2 PUFF(S): 160; 4.5 AEROSOL RESPIRATORY (INHALATION) at 19:06

## 2020-01-25 RX ADMIN — Medication 650 MILLIGRAM(S): at 23:07

## 2020-01-25 RX ADMIN — OXYCODONE HYDROCHLORIDE 5 MILLIGRAM(S): 5 TABLET ORAL at 21:26

## 2020-01-25 RX ADMIN — Medication 650 MILLIGRAM(S): at 12:05

## 2020-01-25 RX ADMIN — SENNA PLUS 2 TABLET(S): 8.6 TABLET ORAL at 21:06

## 2020-01-25 RX ADMIN — CHLORHEXIDINE GLUCONATE 1 APPLICATION(S): 213 SOLUTION TOPICAL at 12:07

## 2020-01-25 RX ADMIN — BUDESONIDE AND FORMOTEROL FUMARATE DIHYDRATE 2 PUFF(S): 160; 4.5 AEROSOL RESPIRATORY (INHALATION) at 07:53

## 2020-01-25 RX ADMIN — OXYCODONE HYDROCHLORIDE 5 MILLIGRAM(S): 5 TABLET ORAL at 20:56

## 2020-01-25 RX ADMIN — OXYCODONE HYDROCHLORIDE 30 MILLIGRAM(S): 5 TABLET ORAL at 18:20

## 2020-01-25 RX ADMIN — Medication 650 MILLIGRAM(S): at 05:57

## 2020-01-25 RX ADMIN — OXYCODONE HYDROCHLORIDE 30 MILLIGRAM(S): 5 TABLET ORAL at 00:40

## 2020-01-25 NOTE — CONSULT NOTE ADULT - SUBJECTIVE AND OBJECTIVE BOX
HARI VÁSQUEZ  59y  Female  Patient seen and examined. Chart reviewed and events noted.  HPI: Patient is post op aorta to superior mesenteric artery bypass on 1/21/20.  Patient under went surgery via [place]. Has moderate to severe COPD - still smoking - c/o SOB and cough more than usual.  She is ambulating with O2 and has already smoked out side the hospital.     PAST MEDICAL & SURGICAL HISTORY:  H/O aneurysm: brain  SOB (shortness of breath)  Fibromyalgia  Spinal stenosis  Hypercholesteremia  COPD (chronic obstructive pulmonary disease) Moderate to Severe  Hx Right calcified nodule   GERD  S/P cerebral aneurysm repair: coil x 20 yrs. ago    No Known Allergies    FAMILY HISTORY:  FH: pancreatic cancer: uncle  FHx: breast cancer: grandmother, aunt  Family history of myocardial infarction in first degree male relative of known age: &gt; 59 yo  Family hx of lung cancer: father    SOCIAL HISTORY  Smoking History: smoker  Alcohol: denied  Drugs: denied  Occupation:  Not working  MEDICATIONS  (STANDING):  acetaminophen   Tablet .. 650 milliGRAM(s) Oral every 6 hours  atorvastatin 20 milliGRAM(s) Oral at bedtime  budesonide 160 MICROgram(s)/formoterol 4.5 MICROgram(s) Inhaler 2 Puff(s) Inhalation two times a day  chlorhexidine 4% Liquid 1 Application(s) Topical <User Schedule>  heparin  Injectable 5000 Unit(s) SubCutaneous every 8 hours  oxyCODONE    IR 30 milliGRAM(s) Oral every 6 hours  pantoprazole  Injectable 40 milliGRAM(s) IV Push daily  senna 2 Tablet(s) Oral at bedtime  tiotropium 18 MICROgram(s) Capsule 1 Capsule(s) Inhalation daily  MEDICATIONS  (PRN):  ALPRAZolam 1 milliGRAM(s) Oral daily PRN Anxiety  oxyCODONE    IR 5 milliGRAM(s) Oral every 4 hours PRN Moderate Pain (4 - 6)    REVIEW OF SYSTEMS:  CONSTITUTIONAL: No fevers or chills. SOB/NORTH and dry cough. Pain at left lateral abdomen   HEENT: No visual disturbance or sore throat  NECK: No pain or stiffness  RESPIRATORY: SOB/NORTH, cough and occasional wheeze  CARDIOVASCULAR: No chest pain or palpitations  GASTROINTESTINAL:  No nausea, vomiting, or diarrhea. No abdominal pain. Constipation.   GENITOURINARY: No dysuria, frequency or hematuria  NEUROLOGICAL: No numbness or headache  EXTREMITIES: No edema  No calf pain or tenderness    Vital Signs Last 24 Hrs  T(F): 97.2 (25 Jan 2020 15:40), Max: 97.7 (24 Jan 2020 23:16)  HR: 85 (25 Jan 2020 15:40) (81 - 89)  BP: 109/56 (25 Jan 2020 15:40) (103/51 - 114/55)  RR: 19 (25 Jan 2020 15:40) (19 - 20)  SpO2: 95% (24 Jan 2020 23:16) (95% - 95%) O2 NC    PHYSICAL EXAM:  Constitutional: A A O x 3 NAD Freely speaking. O2 in use. OOB to chair  HEAD: PERRLA, No JVD, Atraumatic, Normocephalic  Neck: No neck pain  Respiratory: Decreased BS bilaterally. Some discomfort at surgical site. Clean and dry incision.   Cardiovascular: regular rate and rhythm  Gastrointestinal: Soft NT +BS  Extremities: No E/C/C  No calf pain or tenderness. Movement in all four extremities  Skin: No lesions    LABS:                        7.2    6.08  )-----------( 190      ( 25 Jan 2020 06:32 )             22.8     01-25    141  |  105  |  8<L>  ----------------------------<  107<H>  4.0   |  22  |  0.9    Ca    8.5      25 Jan 2020 06:32  Phos  3.4     01-25  Mg     2.0     01-25    RADIOLOGY:  Chest X-Ray: Admitting was NAD  Post op films with left basilar atelectasis due to splinting      ASSESSMENT:  Post op aorta to superior mesenteric artery bypass on 1/21/20 via [place]  Chronic SOB & cough  Moderate to severe COPD  Tobacco use  Atelectasis Left base - splitting due to pain  Hx right calcified nodule  GERD    PLAN:  O2 at 2 liters. Keep Sat at 92%  Check Pox on RA  Bronchodilator Rx - patient can use MDI   Prednisone 20 mg daily X 5 days  CPAP / BiPAP HS if needed  Incentive spirometer bedside  Deep breathing  OOB to chair   Pain mgt.   Stool softeners   GI / DVT prophylaxis   Smoking cessation   Consider rehab   Out patient follow up  THANK YOU

## 2020-01-25 NOTE — PROGRESS NOTE ADULT - SUBJECTIVE AND OBJECTIVE BOX
GENERAL SURGERY PROGRESS NOTE     HARI VÁSQUEZ  59y  Female  Hospital day: 5  POD: 4  Procedure: Bypass of superior mesenteric artery    OVERNIGHT EVENTS: No acute events overnight, patient reports passing gas, no bowel movement     T(F): 97.7 (01-24-20 @ 23:16), Max: 97.7 (01-24-20 @ 23:16)  HR: 81 (01-24-20 @ 23:16) (72 - 81)  BP: 114/55 (01-24-20 @ 23:16) (112/54 - 115/53)  RR: 20 (01-24-20 @ 23:16) (19 - 20)  SpO2: 95% (01-24-20 @ 23:16) (95% - 95%)    URINE:   01-23-20 @ 07:01  -  01-24-20 @ 07:00  --------------------------------------------------------  OUT: 250 mL     GI proph:  pantoprazole  Injectable 40 milliGRAM(s) IV Push daily    AC/ proph: heparin  Injectable 5000 Unit(s) SubCutaneous every 8 hours    PHYSICAL EXAM:  General: Alert and oriented times 3, not in acute distress   Heart: Regular rate and rhythm, no rubs , murmurs or gallops  Lungs: Clear to auscultation bilaterally, no wheezes, rales, rhonchi appreciated  Abdomen: Soft , positive bowel sounds, no tenderness, no distention, no peritoneal signs, incision clean dry and intact   Extremities: warm extremities,  good color, no swelling, motor and sensation , pulses       LABS               7.5    7.79  )-----------( 177      ( 24 Jan 2020 05:49 )             22.9         01-24    139  |  105  |  13  ----------------------------<  88  4.4   |  23  |  1.0      Calcium, Total Serum: 8.1 mg/dL (01-24-20 @ 05:49)    RADIOLOGY & ADDITIONAL TESTS:  < from: Xray Chest 1 View- PORTABLE-Routine (01.24.20 @ 06:40) >  Impression:      Increase in left basilar opacity/effusion.    < end of copied text >

## 2020-01-25 NOTE — PROGRESS NOTE ADULT - ASSESSMENT
59F s/p SMA bypass    -Hemodynamically stable  -PT/Rehab   -Reg diet   -Monitor for gas and BM   -Adequate pain control   -OOB to chair  -f/u CR and CK

## 2020-01-26 LAB
ANION GAP SERPL CALC-SCNC: 14 MMOL/L — SIGNIFICANT CHANGE UP (ref 7–14)
ANION GAP SERPL CALC-SCNC: 16 MMOL/L — HIGH (ref 7–14)
BUN SERPL-MCNC: 7 MG/DL — LOW (ref 10–20)
BUN SERPL-MCNC: 9 MG/DL — LOW (ref 10–20)
CALCIUM SERPL-MCNC: 8.7 MG/DL — SIGNIFICANT CHANGE UP (ref 8.5–10.1)
CALCIUM SERPL-MCNC: 9.1 MG/DL — SIGNIFICANT CHANGE UP (ref 8.5–10.1)
CHLORIDE SERPL-SCNC: 102 MMOL/L — SIGNIFICANT CHANGE UP (ref 98–110)
CHLORIDE SERPL-SCNC: 103 MMOL/L — SIGNIFICANT CHANGE UP (ref 98–110)
CO2 SERPL-SCNC: 23 MMOL/L — SIGNIFICANT CHANGE UP (ref 17–32)
CO2 SERPL-SCNC: 23 MMOL/L — SIGNIFICANT CHANGE UP (ref 17–32)
CREAT SERPL-MCNC: 0.8 MG/DL — SIGNIFICANT CHANGE UP (ref 0.7–1.5)
CREAT SERPL-MCNC: 0.9 MG/DL — SIGNIFICANT CHANGE UP (ref 0.7–1.5)
GLUCOSE BLDC GLUCOMTR-MCNC: 118 MG/DL — HIGH (ref 70–99)
GLUCOSE SERPL-MCNC: 121 MG/DL — HIGH (ref 70–99)
GLUCOSE SERPL-MCNC: 91 MG/DL — SIGNIFICANT CHANGE UP (ref 70–99)
HCT VFR BLD CALC: 21.1 % — LOW (ref 37–47)
HCT VFR BLD CALC: 21.4 % — LOW (ref 37–47)
HGB BLD-MCNC: 7 G/DL — LOW (ref 12–16)
HGB BLD-MCNC: 7.1 G/DL — LOW (ref 12–16)
MAGNESIUM SERPL-MCNC: 1.8 MG/DL — SIGNIFICANT CHANGE UP (ref 1.8–2.4)
MAGNESIUM SERPL-MCNC: 2.2 MG/DL — SIGNIFICANT CHANGE UP (ref 1.8–2.4)
MCHC RBC-ENTMCNC: 32 PG — HIGH (ref 27–31)
MCHC RBC-ENTMCNC: 32.3 PG — HIGH (ref 27–31)
MCHC RBC-ENTMCNC: 32.7 G/DL — SIGNIFICANT CHANGE UP (ref 32–37)
MCHC RBC-ENTMCNC: 33.6 G/DL — SIGNIFICANT CHANGE UP (ref 32–37)
MCV RBC AUTO: 95.9 FL — SIGNIFICANT CHANGE UP (ref 81–99)
MCV RBC AUTO: 97.7 FL — SIGNIFICANT CHANGE UP (ref 81–99)
NRBC # BLD: 0 /100 WBCS — SIGNIFICANT CHANGE UP (ref 0–0)
NRBC # BLD: 0 /100 WBCS — SIGNIFICANT CHANGE UP (ref 0–0)
PHOSPHATE SERPL-MCNC: 3.5 MG/DL — SIGNIFICANT CHANGE UP (ref 2.1–4.9)
PHOSPHATE SERPL-MCNC: 3.5 MG/DL — SIGNIFICANT CHANGE UP (ref 2.1–4.9)
PLATELET # BLD AUTO: 245 K/UL — SIGNIFICANT CHANGE UP (ref 130–400)
PLATELET # BLD AUTO: 275 K/UL — SIGNIFICANT CHANGE UP (ref 130–400)
POTASSIUM SERPL-MCNC: 3.6 MMOL/L — SIGNIFICANT CHANGE UP (ref 3.5–5)
POTASSIUM SERPL-MCNC: 5.5 MMOL/L — HIGH (ref 3.5–5)
POTASSIUM SERPL-SCNC: 3.6 MMOL/L — SIGNIFICANT CHANGE UP (ref 3.5–5)
POTASSIUM SERPL-SCNC: 5.5 MMOL/L — HIGH (ref 3.5–5)
RBC # BLD: 2.19 M/UL — LOW (ref 4.2–5.4)
RBC # BLD: 2.2 M/UL — LOW (ref 4.2–5.4)
RBC # FLD: 13.4 % — SIGNIFICANT CHANGE UP (ref 11.5–14.5)
RBC # FLD: 13.5 % — SIGNIFICANT CHANGE UP (ref 11.5–14.5)
SODIUM SERPL-SCNC: 140 MMOL/L — SIGNIFICANT CHANGE UP (ref 135–146)
SODIUM SERPL-SCNC: 141 MMOL/L — SIGNIFICANT CHANGE UP (ref 135–146)
WBC # BLD: 5.86 K/UL — SIGNIFICANT CHANGE UP (ref 4.8–10.8)
WBC # BLD: 6.35 K/UL — SIGNIFICANT CHANGE UP (ref 4.8–10.8)
WBC # FLD AUTO: 5.86 K/UL — SIGNIFICANT CHANGE UP (ref 4.8–10.8)
WBC # FLD AUTO: 6.35 K/UL — SIGNIFICANT CHANGE UP (ref 4.8–10.8)

## 2020-01-26 PROCEDURE — 71045 X-RAY EXAM CHEST 1 VIEW: CPT | Mod: 26

## 2020-01-26 PROCEDURE — 99024 POSTOP FOLLOW-UP VISIT: CPT

## 2020-01-26 RX ORDER — POTASSIUM CHLORIDE 20 MEQ
40 PACKET (EA) ORAL EVERY 4 HOURS
Refills: 0 | Status: COMPLETED | OUTPATIENT
Start: 2020-01-26 | End: 2020-01-26

## 2020-01-26 RX ORDER — MAGNESIUM SULFATE 500 MG/ML
1 VIAL (ML) INJECTION ONCE
Refills: 0 | Status: COMPLETED | OUTPATIENT
Start: 2020-01-26 | End: 2020-01-26

## 2020-01-26 RX ORDER — MULTIVIT-MIN/FERROUS GLUCONATE 9 MG/15 ML
15 LIQUID (ML) ORAL DAILY
Refills: 0 | Status: DISCONTINUED | OUTPATIENT
Start: 2020-01-26 | End: 2020-01-27

## 2020-01-26 RX ADMIN — HEPARIN SODIUM 5000 UNIT(S): 5000 INJECTION INTRAVENOUS; SUBCUTANEOUS at 21:18

## 2020-01-26 RX ADMIN — Medication 1 MILLIGRAM(S): at 23:45

## 2020-01-26 RX ADMIN — Medication 10 MILLIGRAM(S): at 17:29

## 2020-01-26 RX ADMIN — OXYCODONE HYDROCHLORIDE 30 MILLIGRAM(S): 5 TABLET ORAL at 17:18

## 2020-01-26 RX ADMIN — HEPARIN SODIUM 5000 UNIT(S): 5000 INJECTION INTRAVENOUS; SUBCUTANEOUS at 05:08

## 2020-01-26 RX ADMIN — Medication 1 MILLIGRAM(S): at 08:18

## 2020-01-26 RX ADMIN — OXYCODONE HYDROCHLORIDE 30 MILLIGRAM(S): 5 TABLET ORAL at 23:37

## 2020-01-26 RX ADMIN — OXYCODONE HYDROCHLORIDE 30 MILLIGRAM(S): 5 TABLET ORAL at 12:00

## 2020-01-26 RX ADMIN — BUDESONIDE AND FORMOTEROL FUMARATE DIHYDRATE 2 PUFF(S): 160; 4.5 AEROSOL RESPIRATORY (INHALATION) at 21:21

## 2020-01-26 RX ADMIN — BUDESONIDE AND FORMOTEROL FUMARATE DIHYDRATE 2 PUFF(S): 160; 4.5 AEROSOL RESPIRATORY (INHALATION) at 07:32

## 2020-01-26 RX ADMIN — OXYCODONE HYDROCHLORIDE 30 MILLIGRAM(S): 5 TABLET ORAL at 05:29

## 2020-01-26 RX ADMIN — HEPARIN SODIUM 5000 UNIT(S): 5000 INJECTION INTRAVENOUS; SUBCUTANEOUS at 13:22

## 2020-01-26 RX ADMIN — Medication 20 MILLIGRAM(S): at 07:31

## 2020-01-26 RX ADMIN — PANTOPRAZOLE SODIUM 40 MILLIGRAM(S): 20 TABLET, DELAYED RELEASE ORAL at 11:31

## 2020-01-26 RX ADMIN — Medication 100 GRAM(S): at 17:29

## 2020-01-26 RX ADMIN — OXYCODONE HYDROCHLORIDE 30 MILLIGRAM(S): 5 TABLET ORAL at 11:30

## 2020-01-26 RX ADMIN — Medication 40 MILLIEQUIVALENT(S): at 17:17

## 2020-01-26 RX ADMIN — SENNA PLUS 2 TABLET(S): 8.6 TABLET ORAL at 21:19

## 2020-01-26 RX ADMIN — Medication 40 MILLIEQUIVALENT(S): at 21:19

## 2020-01-26 RX ADMIN — CHLORHEXIDINE GLUCONATE 1 APPLICATION(S): 213 SOLUTION TOPICAL at 05:08

## 2020-01-26 RX ADMIN — ATORVASTATIN CALCIUM 20 MILLIGRAM(S): 80 TABLET, FILM COATED ORAL at 21:19

## 2020-01-26 RX ADMIN — OXYCODONE HYDROCHLORIDE 30 MILLIGRAM(S): 5 TABLET ORAL at 05:07

## 2020-01-26 NOTE — PROGRESS NOTE ADULT - SUBJECTIVE AND OBJECTIVE BOX
GENERAL SURGERY PROGRESS NOTE     HARI VÁSQUEZ  59y  Female  Hospital day :5d  POD:  Procedure: Bypass of superior mesenteric artery    OVERNIGHT EVENTS: complaining of constipation    T(F): 97.5 (01-25-20 @ 23:53), Max: 97.5 (01-25-20 @ 23:53)  HR: 78 (01-25-20 @ 23:53) (78 - 89)  BP: 119/58 (01-25-20 @ 23:53) (103/51 - 119/58)  ABP: --  ABP(mean): --  RR: 19 (01-25-20 @ 23:53) (19 - 20)  SpO2: --         GI proph:  pantoprazole  Injectable 40 milliGRAM(s) IV Push daily    AC/ proph: heparin  Injectable 5000 Unit(s) SubCutaneous every 8 hours    ABx:     PHYSICAL EXAM:  General: Alert and oriented times 3, not in acute distress   Heart: Regular rate and rhythm, no rubs , murmurs or gallops  Lungs: Clear to auscultation bilaterally, no wheezes, rales, rhonchi appreciated  Abdomen: Soft , positive bowel sounds, no tenderness, no distention, no peritoneal signs, incision clean dry and intact   Extremities: warm extremities,  good color, no swelling, motor and sensation , pulses      LABS  Labs:  CAPILLARY BLOOD GLUCOSE      POCT Blood Glucose.: 123 mg/dL (25 Jan 2020 11:56)                          7.2    6.08  )-----------( 190      ( 25 Jan 2020 06:32 )             22.8         01-25    141  |  105  |  8<L>  ----------------------------<  107<H>  4.0   |  22  |  0.9      Calcium, Total Serum: 8.5 mg/dL (01-25-20 @ 06:32)      LFTs:         Coags:                    RADIOLOGY & ADDITIONAL TESTS:      No new studies

## 2020-01-26 NOTE — PROGRESS NOTE ADULT - ASSESSMENT
59F s/p SMA bypass    -Hemodynamically stable  -PT: home  -[facility] Rehab  -Reg diet   -Monitor for gas and BM   -Adequate pain control   -OOB to chair  -f/u CR and CK   -bowel regmien: dulcolax suppository, mineral oil    -Pulmonology recs: prednisone 20mg QD x 5 days

## 2020-01-27 LAB
BLD GP AB SCN SERPL QL: SIGNIFICANT CHANGE UP
GLUCOSE BLDC GLUCOMTR-MCNC: 108 MG/DL — HIGH (ref 70–99)
GLUCOSE BLDC GLUCOMTR-MCNC: 166 MG/DL — HIGH (ref 70–99)
GLUCOSE BLDC GLUCOMTR-MCNC: 219 MG/DL — HIGH (ref 70–99)

## 2020-01-27 PROCEDURE — 99024 POSTOP FOLLOW-UP VISIT: CPT

## 2020-01-27 RX ORDER — MULTIVIT-MIN/FERROUS GLUCONATE 9 MG/15 ML
1 LIQUID (ML) ORAL DAILY
Refills: 0 | Status: DISCONTINUED | OUTPATIENT
Start: 2020-01-27 | End: 2020-01-28

## 2020-01-27 RX ADMIN — HEPARIN SODIUM 5000 UNIT(S): 5000 INJECTION INTRAVENOUS; SUBCUTANEOUS at 13:57

## 2020-01-27 RX ADMIN — OXYCODONE HYDROCHLORIDE 30 MILLIGRAM(S): 5 TABLET ORAL at 12:16

## 2020-01-27 RX ADMIN — OXYCODONE HYDROCHLORIDE 30 MILLIGRAM(S): 5 TABLET ORAL at 00:07

## 2020-01-27 RX ADMIN — TIOTROPIUM BROMIDE 1 CAPSULE(S): 18 CAPSULE ORAL; RESPIRATORY (INHALATION) at 09:11

## 2020-01-27 RX ADMIN — SENNA PLUS 2 TABLET(S): 8.6 TABLET ORAL at 21:29

## 2020-01-27 RX ADMIN — OXYCODONE HYDROCHLORIDE 30 MILLIGRAM(S): 5 TABLET ORAL at 06:15

## 2020-01-27 RX ADMIN — Medication 1 MILLIGRAM(S): at 22:22

## 2020-01-27 RX ADMIN — HEPARIN SODIUM 5000 UNIT(S): 5000 INJECTION INTRAVENOUS; SUBCUTANEOUS at 21:29

## 2020-01-27 RX ADMIN — OXYCODONE HYDROCHLORIDE 30 MILLIGRAM(S): 5 TABLET ORAL at 17:57

## 2020-01-27 RX ADMIN — ATORVASTATIN CALCIUM 20 MILLIGRAM(S): 80 TABLET, FILM COATED ORAL at 21:29

## 2020-01-27 RX ADMIN — OXYCODONE HYDROCHLORIDE 30 MILLIGRAM(S): 5 TABLET ORAL at 11:46

## 2020-01-27 RX ADMIN — BUDESONIDE AND FORMOTEROL FUMARATE DIHYDRATE 2 PUFF(S): 160; 4.5 AEROSOL RESPIRATORY (INHALATION) at 07:39

## 2020-01-27 RX ADMIN — BUDESONIDE AND FORMOTEROL FUMARATE DIHYDRATE 2 PUFF(S): 160; 4.5 AEROSOL RESPIRATORY (INHALATION) at 20:03

## 2020-01-27 RX ADMIN — Medication 20 MILLIGRAM(S): at 05:45

## 2020-01-27 RX ADMIN — Medication 1 TABLET(S): at 11:41

## 2020-01-27 RX ADMIN — OXYCODONE HYDROCHLORIDE 5 MILLIGRAM(S): 5 TABLET ORAL at 22:22

## 2020-01-27 RX ADMIN — Medication 5 MILLIGRAM(S): at 17:56

## 2020-01-27 RX ADMIN — OXYCODONE HYDROCHLORIDE 30 MILLIGRAM(S): 5 TABLET ORAL at 05:45

## 2020-01-27 RX ADMIN — PANTOPRAZOLE SODIUM 40 MILLIGRAM(S): 20 TABLET, DELAYED RELEASE ORAL at 11:42

## 2020-01-27 NOTE — PROGRESS NOTE ADULT - ASSESSMENT
Assessment:  59F s/p SMA bypass    Plan:  -Hemodynamically stable  -PT/Rehab   -Reg diet   -Monitor for gas and BM   -Adequate pain control   -trend H&H

## 2020-01-27 NOTE — PROVIDER CONTACT NOTE (OTHER) - ASSESSMENT
Order noted as daily, last administered at 08:18a Order noted as daily, last administered at 08:18a  /52 hr 73

## 2020-01-27 NOTE — PROGRESS NOTE ADULT - SUBJECTIVE AND OBJECTIVE BOX
GENERAL SURGERY PROGRESS NOTE     HARI VÁSQUEZ  59y  Female  Hospital day: 7d  POD: 6d  Procedure: Bypass of superior mesenteric artery    OVERNIGHT EVENTS: patient's Hgb has been downtrending, currently 7.1 from 7.0, patient has been passing gas, no BM, tolerating regular diet    T(F): 96.9 (01-27-20 @ 02:56), Max: 98.1 (01-26-20 @ 07:35)  HR: 90 (01-27-20 @ 02:56) (72 - 99)  BP: 120/60 (01-27-20 @ 02:56) (114/51 - 157/52)  RR: 18 (01-27-20 @ 02:56) (18 - 18)    DIET/FLUIDS: multivitamin/minerals 1 Tablet(s) Oral daily    GI proph:  pantoprazole  Injectable 40 milliGRAM(s) IV Push daily    AC/ proph: heparin  Injectable 5000 Unit(s) SubCutaneous every 8 hours    PHYSICAL EXAM:  GENERAL: NAD, well-appearing  CHEST/LUNG: Clear to auscultation bilaterally  HEART: Regular rate and rhythm  ABDOMEN: Soft, Nontender, Nondistended;   BACK: hematoma present  EXTREMITIES:  No clubbing, cyanosis, or edema  PULSES: DP and PT doppler signals present    LABS  CAPILLARY BLOOD GLUCOSE  POCT Blood Glucose.: 118 mg/dL (26 Jan 2020 11:59)                        7.1    5.86  )-----------( 275      ( 26 Jan 2020 21:30 )             21.1       01-26    140  |  103  |  9<L>  ----------------------------<  121<H>  5.5<H>   |  23  |  0.8    Calcium, Total Serum: 9.1 mg/dL (01-26-20 @ 21:30)

## 2020-01-28 ENCOUNTER — TRANSCRIPTION ENCOUNTER (OUTPATIENT)
Age: 60
End: 2020-01-28

## 2020-01-28 VITALS — WEIGHT: 115.96 LBS | HEIGHT: 61 IN

## 2020-01-28 LAB
ANION GAP SERPL CALC-SCNC: 11 MMOL/L — SIGNIFICANT CHANGE UP (ref 7–14)
BUN SERPL-MCNC: 8 MG/DL — LOW (ref 10–20)
CALCIUM SERPL-MCNC: 9.3 MG/DL — SIGNIFICANT CHANGE UP (ref 8.5–10.1)
CHLORIDE SERPL-SCNC: 104 MMOL/L — SIGNIFICANT CHANGE UP (ref 98–110)
CO2 SERPL-SCNC: 29 MMOL/L — SIGNIFICANT CHANGE UP (ref 17–32)
CREAT SERPL-MCNC: 0.9 MG/DL — SIGNIFICANT CHANGE UP (ref 0.7–1.5)
GLUCOSE BLDC GLUCOMTR-MCNC: 154 MG/DL — HIGH (ref 70–99)
GLUCOSE BLDC GLUCOMTR-MCNC: 97 MG/DL — SIGNIFICANT CHANGE UP (ref 70–99)
GLUCOSE SERPL-MCNC: 97 MG/DL — SIGNIFICANT CHANGE UP (ref 70–99)
HCT VFR BLD CALC: 26.7 % — LOW (ref 37–47)
HGB BLD-MCNC: 8.7 G/DL — LOW (ref 12–16)
MAGNESIUM SERPL-MCNC: 1.8 MG/DL — SIGNIFICANT CHANGE UP (ref 1.8–2.4)
MCHC RBC-ENTMCNC: 30.5 PG — SIGNIFICANT CHANGE UP (ref 27–31)
MCHC RBC-ENTMCNC: 32.6 G/DL — SIGNIFICANT CHANGE UP (ref 32–37)
MCV RBC AUTO: 93.7 FL — SIGNIFICANT CHANGE UP (ref 81–99)
NRBC # BLD: 0 /100 WBCS — SIGNIFICANT CHANGE UP (ref 0–0)
PHOSPHATE SERPL-MCNC: 3.8 MG/DL — SIGNIFICANT CHANGE UP (ref 2.1–4.9)
PLATELET # BLD AUTO: 341 K/UL — SIGNIFICANT CHANGE UP (ref 130–400)
POTASSIUM SERPL-MCNC: 4.6 MMOL/L — SIGNIFICANT CHANGE UP (ref 3.5–5)
POTASSIUM SERPL-SCNC: 4.6 MMOL/L — SIGNIFICANT CHANGE UP (ref 3.5–5)
RBC # BLD: 2.85 M/UL — LOW (ref 4.2–5.4)
RBC # FLD: 17.2 % — HIGH (ref 11.5–14.5)
SODIUM SERPL-SCNC: 144 MMOL/L — SIGNIFICANT CHANGE UP (ref 135–146)
WBC # BLD: 10.99 K/UL — HIGH (ref 4.8–10.8)
WBC # FLD AUTO: 10.99 K/UL — HIGH (ref 4.8–10.8)

## 2020-01-28 PROCEDURE — 99024 POSTOP FOLLOW-UP VISIT: CPT

## 2020-01-28 RX ORDER — TIOTROPIUM BROMIDE 18 UG/1
1 CAPSULE ORAL; RESPIRATORY (INHALATION)
Qty: 1 | Refills: 1
Start: 2020-01-28

## 2020-01-28 RX ORDER — SENNA PLUS 8.6 MG/1
2 TABLET ORAL
Qty: 0 | Refills: 0 | DISCHARGE
Start: 2020-01-28

## 2020-01-28 RX ORDER — MULTIVIT-MIN/FERROUS GLUCONATE 9 MG/15 ML
1 LIQUID (ML) ORAL
Qty: 0 | Refills: 0 | DISCHARGE
Start: 2020-01-28

## 2020-01-28 RX ADMIN — TIOTROPIUM BROMIDE 1 CAPSULE(S): 18 CAPSULE ORAL; RESPIRATORY (INHALATION) at 09:43

## 2020-01-28 RX ADMIN — CHLORHEXIDINE GLUCONATE 1 APPLICATION(S): 213 SOLUTION TOPICAL at 05:42

## 2020-01-28 RX ADMIN — OXYCODONE HYDROCHLORIDE 30 MILLIGRAM(S): 5 TABLET ORAL at 05:49

## 2020-01-28 RX ADMIN — PANTOPRAZOLE SODIUM 40 MILLIGRAM(S): 20 TABLET, DELAYED RELEASE ORAL at 11:28

## 2020-01-28 RX ADMIN — Medication 1 TABLET(S): at 11:28

## 2020-01-28 RX ADMIN — OXYCODONE HYDROCHLORIDE 30 MILLIGRAM(S): 5 TABLET ORAL at 00:05

## 2020-01-28 RX ADMIN — OXYCODONE HYDROCHLORIDE 30 MILLIGRAM(S): 5 TABLET ORAL at 11:29

## 2020-01-28 RX ADMIN — Medication 20 MILLIGRAM(S): at 05:47

## 2020-01-28 RX ADMIN — HEPARIN SODIUM 5000 UNIT(S): 5000 INJECTION INTRAVENOUS; SUBCUTANEOUS at 05:46

## 2020-01-28 RX ADMIN — OXYCODONE HYDROCHLORIDE 30 MILLIGRAM(S): 5 TABLET ORAL at 11:59

## 2020-01-28 NOTE — DISCHARGE NOTE NURSING/CASE MANAGEMENT/SOCIAL WORK - PATIENT PORTAL LINK FT
You can access the FollowMyHealth Patient Portal offered by [facility] by registering at the following website: [email or web address]. By joining [facility]’s FollowMyHealth portal, you will also be able to view your health information using other applications (apps) compatible with our system.

## 2020-01-28 NOTE — DISCHARGE NOTE PROVIDER - NSDCCPCAREPLAN_GEN_ALL_CORE_FT
PRINCIPAL DISCHARGE DIAGNOSIS  Diagnosis: Superior mesenteric artery stenosis  Assessment and Plan of Treatment:

## 2020-01-28 NOTE — DISCHARGE NOTE PROVIDER - PROVIDER TOKENS
PROVIDER:[TOKEN:[[id number]],FOLLOWUP:[2 weeks],ESTABLISHEDPATIENT:[T]],PROVIDER:[TOKEN:[[id number]],FOLLOWUP:[1 week]]

## 2020-01-28 NOTE — DIETITIAN INITIAL EVALUATION ADULT. - PHYSICAL APPEARANCE
well nourished/alert and oriented. BMI: 20.6, IBW: 105#, UBW: ~116# (last weighed 6/19, states she lost wt from stress), no edema noted, surgical incision.

## 2020-01-28 NOTE — DISCHARGE NOTE NURSING/CASE MANAGEMENT/SOCIAL WORK - NSDCPEWEB_GEN_ALL_CORE
[place] website --- [email or web address] Tobacco [email or web address] website --- [email or web address]

## 2020-01-28 NOTE — DISCHARGE NOTE NURSING/CASE MANAGEMENT/SOCIAL WORK - NSDCPEPAMP_GEN_ALL_CORE
need to admit/lab results “[facility] for Tobacco Control” pamphlet given

## 2020-01-28 NOTE — DIETITIAN INITIAL EVALUATION ADULT. - OTHER INFO
Nutrition Hx PTA: Pt typically consumes small, frequent meals daily as she can't eat too much at one time. Denies use of oral nutrition supplements and NKFA, however suspects to have some lactose intolerance as sometimes she can't have milk in her cereal. Pt with no cultural/Hinduism restrictions.     Pt s/p SMA bypass--Hgb has been downtrending, currently 7.1 from 7.0. Currently, hemodynamically stable; PT/Rehab.

## 2020-01-28 NOTE — DISCHARGE NOTE PROVIDER - CARE PROVIDER_API CALL
Sundeep Dumont)  Surgery; Vascular Surgery  [address] [place] [address]  Phone: [phone]  Fax: [phone]  [facility] Patient  Follow Up Time: 2 weeks    Yoel Hinkle)  Medicine  [address] [place] [address]  Phone: [phone]  Fax: [phone]  Follow Up Time: 1 week

## 2020-01-28 NOTE — DIETITIAN INITIAL EVALUATION ADULT. - NAME AND PHONE
Pt to maintain % po intake of meals and snacks over the next 7 days. RD to monitor diet order, energy intake, body composition, glucose profile

## 2020-01-28 NOTE — PROGRESS NOTE ADULT - SUBJECTIVE AND OBJECTIVE BOX
Procedure:bypass of superior mesenteric artery   Patient is a 59y old  Female who presents with a chief complaint of   PAST MEDICAL & SURGICAL HISTORY:  H/O aneurysm: brain  SOB (shortness of breath)  Fibromyalgia  Spinal stenosis  Hypercholesteremia  COPD (chronic obstructive pulmonary disease)  S/P cerebral aneurysm repair: coil x 20 yrs ago      Events of the Last 24h:none  Vital Signs Last 24 Hrs  T(C): 36.3 (2020 23:15), Max: 36.5 (2020 07:33)  T(F): 97.4 (2020 23:15), Max: 97.7 (2020 07:33)  HR: 70 (2020 23:15) (70 - 90)  BP: 170/77 (2020 23:15) (120/60 - 170/77)  BP(mean): --  RR: 18 (2020 23:15) (18 - 18)  SpO2: 97% (2020 21:59) (95% - 98%)        Diet, Regular (20 @ 10:02)      I&O's Summary    2020 07:  -  2020 01:08  --------------------------------------------------------  IN: 960 mL / OUT: 500 mL / NET: 460 mL     I&O's Detail    2020 07:  -  2020 01:08  --------------------------------------------------------  IN:    Oral Fluid: 960 mL  Total IN: 960 mL    OUT:    Voided: 500 mL  Total OUT: 500 mL    Total NET: 460 mL          MEDICATIONS  (STANDING):  atorvastatin 20 milliGRAM(s) Oral at bedtime  budesonide 160 MICROgram(s)/formoterol 4.5 MICROgram(s) Inhaler 2 Puff(s) Inhalation two times a day  chlorhexidine 4% Liquid 1 Application(s) Topical <User Schedule>  heparin  Injectable 5000 Unit(s) SubCutaneous every 8 hours  multivitamin/minerals 1 Tablet(s) Oral daily  oxyCODONE    IR 30 milliGRAM(s) Oral every 6 hours  pantoprazole  Injectable 40 milliGRAM(s) IV Push daily  predniSONE   Tablet 20 milliGRAM(s) Oral daily  senna 2 Tablet(s) Oral at bedtime  tiotropium 18 MICROgram(s) Capsule 1 Capsule(s) Inhalation daily    MEDICATIONS  (PRN):  ALPRAZolam 1 milliGRAM(s) Oral daily PRN Anxiety  bisacodyl 5 milliGRAM(s) Oral every 12 hours PRN Constipation  bisacodyl Suppository 10 milliGRAM(s) Rectal daily PRN Constipation  mineral oil 30 milliLiter(s) Oral daily PRN constipation  oxyCODONE    IR 5 milliGRAM(s) Oral every 4 hours PRN Moderate Pain (4 - 6)      PHYSICAL EXAM:    GENERAL: NAD    HEENT: NCAT    CHEST/LUNGS: CTAB    HEART: RRR,  No murmurs, rubs, or gallops    ABDOMEN: SNTND +BS    EXTREMITIES:  FROM, No clubbing, cyanosis, or edema, palpable pulse    NEURO: No focal neurological deficits    SKIN: No rashes or lesions    INCISION/WOUNDS:                          7.1    5.86  )-----------( 275      ( 2020 21:30 )             21.1        CBC Full  -  ( 2020 21:30 )  WBC Count : 5.86 K/uL  RBC Count : 2.20 M/uL  Hemoglobin : 7.1 g/dL  Hematocrit : 21.1 %  Platelet Count - Automated : 275 K/uL  Mean Cell Volume : 95.9 fL  Mean Cell Hemoglobin : 32.3 pg  Mean Cell Hemoglobin Concentration : 33.6 g/dL  Auto Neutrophil # : x  Auto Lymphocyte # : x  Auto Monocyte # : x  Auto Eosinophil # : x  Auto Basophil # : x  Auto Neutrophil % : x  Auto Lymphocyte % : x  Auto Monocyte % : x  Auto Eosinophil % : x  Auto Basophil % : x               140   |  103   |  9                  Ca: 9.1    BMP:   ----------------------------< 121    M.2   (20 @ 21:30)             5.5    |  23    | 0.8                Ph: 3.5

## 2020-01-28 NOTE — DISCHARGE NOTE PROVIDER - CARE PROVIDERS DIRECT ADDRESSES
[email or web address]

## 2020-01-28 NOTE — DIETITIAN INITIAL EVALUATION ADULT. - ENERGY NEEDS
Calories: 3298-3478 kcal/day (MSJ x 1.2-1.4 AF)--slightly increased needs d/t recent wt loss  Protein: 49-69 gm/day (1-1.4 gm/kg CBW)--same as mentioned above  Fluid: 1 ml/kcal

## 2020-01-28 NOTE — DIETITIAN INITIAL EVALUATION ADULT. - PERTINENT MEDS FT
heparin, MVI/minerals, prednisone, dulcolax, mineral oil, lipitor, senna, symbicort, protonix, spiriva

## 2020-01-28 NOTE — DISCHARGE NOTE PROVIDER - HOSPITAL COURSE
59y Female PMH COPD (no home O2, never intubated for), HLD, brain aneurysm x2 (1x coil 20 yrs ago, one monitored by Neuro), GERD, spinal stenosis and chronic pain, fibromyalgia, who was admitted in November with COPD exacerbation, hyponatremia, and n/v/d noted to have chronic SMA stenosis.  She is s/p elective Aorto-SMA bypass with ringed PTFE graft on 1/21/20.  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  In the SICU, she has been stable, received labetalol 5 IVP x1 for one episode of SBP in 180.  Patient is not on home anti-HTN meds. She is restarted on home Xanax and advanced to a CLD. HSQ has been started. Bowel regimen started.        Pt was downgraded on 1/23/20. She was evaluated by pulmonary for SOB, and started on Prednisone x 5 days in addition to inhalers. Pt walked with PT. On 1/28/20 she was given 1 u PRBC for post op anemia in a setting of weakness and SOB. Pt responded well.

## 2020-01-28 NOTE — DISCHARGE NOTE PROVIDER - NSDCMRMEDTOKEN_GEN_ALL_CORE_FT
albuterol sulfate hfa: 2 puff(s) inhaled 2 times a day  Anoro Ellipta 62.5 mcg-25 mcg/inh inhalation powder: 1 puff(s) inhaled once a day  atorvastatin 20 mg oral tablet: 1 tab(s) orally once a day (at bedtime)   Multiple Vitamins with Minerals oral tablet: 1 tab(s) orally once a day  oxyCODONE 30 mg oral tablet: 1 tab(s) orally every 6 hours  pantoprazole 40 mg oral delayed release tablet: 1 tab(s) orally once a day before breakfast  predniSONE 20 mg oral tablet: 1 tab(s) orally once a day  senna oral tablet: 2 tab(s) orally once a day (at bedtime)  Symbicort 160 mcg-4.5 mcg/inh inhalation aerosol: 2 puff(s) inhaled 2 times a day, As Needed.Last use 07/10/2019   tiotropium 18 mcg inhalation capsule: 1 cap(s) inhaled once a day  Xanax 1 mg oral tablet: 1 tab(s) orally once a day

## 2020-02-03 DIAGNOSIS — M79.7 FIBROMYALGIA: ICD-10-CM

## 2020-02-03 DIAGNOSIS — R73.9 HYPERGLYCEMIA, UNSPECIFIED: ICD-10-CM

## 2020-02-03 DIAGNOSIS — N17.9 ACUTE KIDNEY FAILURE, UNSPECIFIED: ICD-10-CM

## 2020-02-03 DIAGNOSIS — M48.02 SPINAL STENOSIS, CERVICAL REGION: ICD-10-CM

## 2020-02-03 DIAGNOSIS — K29.70 GASTRITIS, UNSPECIFIED, WITHOUT BLEEDING: ICD-10-CM

## 2020-02-03 DIAGNOSIS — D62 ACUTE POSTHEMORRHAGIC ANEMIA: ICD-10-CM

## 2020-02-03 DIAGNOSIS — K55.1 CHRONIC VASCULAR DISORDERS OF INTESTINE: ICD-10-CM
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
## 2020-02-03 DIAGNOSIS — I70.8 ATHEROSCLEROSIS OF OTHER ARTERIES: ICD-10-CM

## 2020-02-03 DIAGNOSIS — F17.210 NICOTINE DEPENDENCE, CIGARETTES, UNCOMPLICATED: ICD-10-CM

## 2020-02-03 DIAGNOSIS — I70.0 ATHEROSCLEROSIS OF AORTA: ICD-10-CM

## 2020-02-03 DIAGNOSIS — F41.9 ANXIETY DISORDER, UNSPECIFIED: ICD-10-CM

## 2020-02-03 DIAGNOSIS — I45.81 LONG QT SYNDROME: ICD-10-CM

## 2020-02-03 DIAGNOSIS — E83.42 HYPOMAGNESEMIA: ICD-10-CM

## 2020-02-03 DIAGNOSIS — E78.5 HYPERLIPIDEMIA, UNSPECIFIED: ICD-10-CM

## 2020-02-03 DIAGNOSIS — M96.821: ICD-10-CM

## 2020-02-03 DIAGNOSIS — J44.9 CHRONIC OBSTRUCTIVE PULMONARY DISEASE, UNSPECIFIED: ICD-10-CM

## 2020-02-03 DIAGNOSIS — E87.1 HYPO-OSMOLALITY AND HYPONATREMIA: ICD-10-CM

## 2020-02-03 DIAGNOSIS — I97.42 INTRAOPERATIVE HEMORRHAGE AND HEMATOMA OF A CIRCULATORY SYSTEM ORGAN OR STRUCTURE COMPLICATING OTHER PROCEDURE: ICD-10-CM

## 2020-02-03 DIAGNOSIS — I67.1 CEREBRAL ANEURYSM, NONRUPTURED: ICD-10-CM

## 2020-02-03 DIAGNOSIS — K21.9 GASTRO-ESOPHAGEAL REFLUX DISEASE WITHOUT ESOPHAGITIS: ICD-10-CM
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
## 2020-02-06 ENCOUNTER — APPOINTMENT (OUTPATIENT)
Dept: VASCULAR SURGERY | Facility: CLINIC | Age: 60
End: 2020-02-06
Payer: MEDICAID

## 2020-02-06 PROCEDURE — 99024 POSTOP FOLLOW-UP VISIT: CPT

## 2020-02-06 NOTE — ASSESSMENT
[FreeTextEntry1] : 58 y/o female with h/o smoking, SMA stenosis, mesenteric ischemia and underwent aorto-SMA PTFE bypass on 1/21/2020. Presents for followup, is able to eat small meals. She is recovering from surgery and needs assistance with ADLs, ambulating with assistance of walker and will benefit from use of a seat shower.\par The surgical incision is healing, clean dry and without any drainage or cellulitis. I have advised her to continue to eat small meals, and see me back in 6 weeks for duplex of the bypass graft. She will follow up with Dr. Melendez for pain medications that she has been on chronically.\par

## 2020-02-06 NOTE — HISTORY OF PRESENT ILLNESS
[FreeTextEntry1] : 60 y/o female with h/o smoking, SMA stenosis, mesenteric ischemia and underwent aorto-SMA PTFE bypass on 1/21/2020. Presents for followup, is able to eat small meals.

## 2020-03-02 ENCOUNTER — APPOINTMENT (OUTPATIENT)
Dept: GASTROENTEROLOGY | Facility: CLINIC | Age: 60
End: 2020-03-02

## 2020-03-06 ENCOUNTER — APPOINTMENT (OUTPATIENT)
Dept: GASTROENTEROLOGY | Facility: CLINIC | Age: 60
End: 2020-03-06
Payer: MEDICAID

## 2020-03-06 ENCOUNTER — OUTPATIENT (OUTPATIENT)
Dept: OUTPATIENT SERVICES | Facility: HOSPITAL | Age: 60
LOS: 1 days | Discharge: HOME | End: 2020-03-06

## 2020-03-06 VITALS
HEART RATE: 63 BPM | BODY MASS INDEX: 17.72 KG/M2 | DIASTOLIC BLOOD PRESSURE: 82 MMHG | HEIGHT: 63 IN | TEMPERATURE: 97.9 F | WEIGHT: 100 LBS | SYSTOLIC BLOOD PRESSURE: 137 MMHG

## 2020-03-06 DIAGNOSIS — Z98.890 OTHER SPECIFIED POSTPROCEDURAL STATES: Chronic | ICD-10-CM

## 2020-03-06 DIAGNOSIS — Z00.00 ENCOUNTER FOR GENERAL ADULT MEDICAL EXAMINATION W/OUT ABNORMAL FINDINGS: ICD-10-CM

## 2020-03-06 DIAGNOSIS — R11.2 NAUSEA WITH VOMITING, UNSPECIFIED: ICD-10-CM

## 2020-03-06 PROCEDURE — 99214 OFFICE O/P EST MOD 30 MIN: CPT

## 2020-03-06 RX ORDER — PANTOPRAZOLE 40 MG/1
40 TABLET, DELAYED RELEASE ORAL TWICE DAILY
Qty: 60 | Refills: 3 | Status: ACTIVE | COMMUNITY
Start: 2020-03-06 | End: 1900-01-01

## 2020-03-06 RX ORDER — POLYETHYLENE GLYCOL 3350 17 G/17G
17 POWDER, FOR SOLUTION ORAL DAILY
Qty: 510 | Refills: 3 | Status: ACTIVE | COMMUNITY
Start: 2020-03-06 | End: 1900-01-01

## 2020-03-06 NOTE — ASSESSMENT
[FreeTextEntry1] : A 59 y old female patient with pmhx of Anxiety , COPD, SMA stenosis sp  aorto- SMA Bypass on 1/21/2020 presented for evaluation for vomiting and heartburn\par \par # Vomiting and heartburn with dysphagia \par Rec: \par Protonix 40 po BID \par Xray esophagram \par Will do EGD \par Not on AC \par ASA 2\par \par # Constipation \par Will send for miralax \par No colonoscopy on records \par Will schedule for colonoscopy later this year (on next visit) \par ASA 2 \par \par

## 2020-03-06 NOTE — HISTORY OF PRESENT ILLNESS
[de-identified] : A 59 y old female patient with pmhx of Anxiety , COPD, SMA stenosis sp  aorto- SMA Bypass on 1/21/2020 presented for evaluation for vomiting and heartburn. Pt reports having Heartburn that got worse after surgery, also feeling water and food stucj in her chest more frequently, and LUQ pain at surgery site and feeling constipated. Pt denies any rectal bleed, hematemesis or melena \par \par

## 2020-03-06 NOTE — PHYSICAL EXAM
[General Appearance - Alert] : alert [General Appearance - In No Acute Distress] : in no acute distress [Sclera] : the sclera and conjunctiva were normal [Extraocular Movements] : extraocular movements were intact [Outer Ear] : the ears and nose were normal in appearance [Hearing Threshold Finger Rub Not [place]] : hearing was normal [Neck Appearance] : the appearance of the neck was normal [Neck Cervical Mass (___cm)] : no neck mass was observed [] : no respiratory distress [Exaggerated Use Of Accessory Muscles For Inspiration] : no accessory muscle use [Bowel Sounds] : normal bowel sounds [Abdomen Soft] : soft [FreeTextEntry1] : LUQ tendeness at the surgery site

## 2020-03-19 ENCOUNTER — APPOINTMENT (OUTPATIENT)
Dept: VASCULAR SURGERY | Facility: CLINIC | Age: 60
End: 2020-03-19
Payer: MEDICAID

## 2020-03-19 DIAGNOSIS — I77.1 STRICTURE OF ARTERY: ICD-10-CM

## 2020-03-19 PROCEDURE — 99024 POSTOP FOLLOW-UP VISIT: CPT

## 2020-03-19 PROCEDURE — 93976 VASCULAR STUDY: CPT

## 2020-03-19 NOTE — ASSESSMENT
[FreeTextEntry1] : 60 y/o female with h/o smoking, SMA stenosis, mesenteric ischemia and underwent aorto-SMA PTFE bypass on 1/21/2020. Presents for followup, is able to eat small meals. I performed an arterial duplex which was medically necessary to evaluate for patency of the bypass graft. It showed patent aorto-SMA bypass graft.\par I have informed her of the test results and advised follow up in six months time.

## 2020-03-19 NOTE — HISTORY OF PRESENT ILLNESS
[FreeTextEntry1] : 60 y/o female with h/o smoking, SMA stenosis, mesenteric ischemia and underwent aorto-SMA PTFE bypass on 1/21/2020. Presents for followup, denies any complaints.

## 2020-03-19 NOTE — DATA REVIEWED
[FreeTextEntry1] : I performed an arterial duplex which was medically necessary to evaluate for patency of the bypass graft. It showed patent aorto-SMA bypass graft.\par

## 2020-03-20 ENCOUNTER — OUTPATIENT (OUTPATIENT)
Dept: OUTPATIENT SERVICES | Facility: HOSPITAL | Age: 60
LOS: 1 days | Discharge: HOME | End: 2020-03-20
Payer: MEDICAID

## 2020-03-20 ENCOUNTER — RESULT REVIEW (OUTPATIENT)
Age: 60
End: 2020-03-20

## 2020-03-20 DIAGNOSIS — Z98.890 OTHER SPECIFIED POSTPROCEDURAL STATES: Chronic | ICD-10-CM

## 2020-03-20 DIAGNOSIS — K21.9 GASTRO-ESOPHAGEAL REFLUX DISEASE WITHOUT ESOPHAGITIS: ICD-10-CM

## 2020-03-20 PROCEDURE — 74220 X-RAY XM ESOPHAGUS 1CNTRST: CPT | Mod: 26

## 2020-05-01 ENCOUNTER — OUTPATIENT (OUTPATIENT)
Dept: OUTPATIENT SERVICES | Facility: HOSPITAL | Age: 60
LOS: 1 days | End: 2020-05-01
Payer: MEDICAID

## 2020-05-01 DIAGNOSIS — Z98.890 OTHER SPECIFIED POSTPROCEDURAL STATES: Chronic | ICD-10-CM

## 2020-05-01 PROCEDURE — G9001: CPT

## 2020-05-06 DIAGNOSIS — Z71.89 OTHER SPECIFIED COUNSELING: ICD-10-CM

## 2020-08-06 ENCOUNTER — APPOINTMENT (OUTPATIENT)
Dept: OTOLARYNGOLOGY | Facility: CLINIC | Age: 60
End: 2020-08-06
Payer: MEDICAID

## 2020-08-06 DIAGNOSIS — H61.22 IMPACTED CERUMEN, LEFT EAR: ICD-10-CM

## 2020-08-06 DIAGNOSIS — H91.90 UNSPECIFIED HEARING LOSS, UNSPECIFIED EAR: ICD-10-CM

## 2020-08-06 DIAGNOSIS — J34.89 OTHER SPECIFIED DISORDERS OF NOSE AND NASAL SINUSES: ICD-10-CM

## 2020-08-06 DIAGNOSIS — J34.3 HYPERTROPHY OF NASAL TURBINATES: ICD-10-CM

## 2020-08-06 DIAGNOSIS — J34.2 DEVIATED NASAL SEPTUM: ICD-10-CM

## 2020-08-06 DIAGNOSIS — R22.0 LOCALIZED SWELLING, MASS AND LUMP, HEAD: ICD-10-CM

## 2020-08-06 DIAGNOSIS — H90.5 UNSPECIFIED SENSORINEURAL HEARING LOSS: ICD-10-CM

## 2020-08-06 PROCEDURE — 31231 NASAL ENDOSCOPY DX: CPT

## 2020-08-06 PROCEDURE — 99204 OFFICE O/P NEW MOD 45 MIN: CPT | Mod: 25

## 2020-08-06 NOTE — HISTORY OF PRESENT ILLNESS
[de-identified] : Patient here today c/o nasal obstruction. Patient admits breaking her nose a few times in the past. Right nostril is completely blocked. Giving her daily headaches. Facial pressure. Unable to sleep at night due to snoring and unable to breathe. Flonase and allergy meds have failed her. \par She also c/o gradual progressive hearing loss. She has not had a recent audiogram.

## 2020-08-06 NOTE — PROCEDURE
[Recalcitrant Symptoms] : recalcitrant symptoms  [None] : none [Flexible Endoscope] : examined with the flexible endoscope [Congested] : congested [Deviated to the Rt] : deviated to the right [Normal] : the paranasal sinuses had no abnormalities

## 2020-08-06 NOTE — PHYSICAL EXAM
[Nasal Endoscopy Performed] : nasal endoscopy was performed, see procedure section for findings [Midline] : trachea located in midline position [Normal] : external appearance is normal [de-identified] : left cerumen impaction [de-identified] : hypertophy

## 2020-10-01 ENCOUNTER — APPOINTMENT (OUTPATIENT)
Dept: VASCULAR SURGERY | Facility: CLINIC | Age: 60
End: 2020-10-01

## 2020-10-22 ENCOUNTER — APPOINTMENT (OUTPATIENT)
Dept: VASCULAR SURGERY | Facility: CLINIC | Age: 60
End: 2020-10-22
Payer: MEDICAID

## 2020-10-22 PROCEDURE — 99213 OFFICE O/P EST LOW 20 MIN: CPT

## 2020-10-22 PROCEDURE — 93976 VASCULAR STUDY: CPT

## 2020-10-22 PROCEDURE — 99072 ADDL SUPL MATRL&STAF TM PHE: CPT

## 2020-10-22 NOTE — ASSESSMENT
[FreeTextEntry1] : 60 y/o female with h/o smoking, SMA stenosis, mesenteric ischemia and underwent aorto-SMA PTFE bypass on 1/21/2020. Presents for followup, denies any post-prandial abdominal pain, c/o constipation intermittently. \par \par \par Arterial duplex today showed patent aorto-SMA bypass graft.\par \par I have informed her of the test results and advised follow up in six months time. \par \par

## 2020-10-22 NOTE — HISTORY OF PRESENT ILLNESS
[FreeTextEntry1] : 58 y/o female with h/o smoking, SMA stenosis, mesenteric ischemia and underwent aorto-SMA PTFE bypass on 1/21/2020. Presents for followup, denies any post-prandial abdominal pain, c/o constipation intermittently.

## 2020-10-22 NOTE — DATA REVIEWED
[FreeTextEntry1] : I performed an arterial duplex which was medically necessary to evaluate for patency of the bypass graft.\par \par S/P Aorto -SMA Bypass Graft.\par \par Limited study secondary to bowel and fluid collection. The Aorto -SMA Bypass Graft is patent with  of 140-110cm/s.

## 2020-11-02 ENCOUNTER — OUTPATIENT (OUTPATIENT)
Dept: OUTPATIENT SERVICES | Facility: HOSPITAL | Age: 60
LOS: 1 days | Discharge: HOME | End: 2020-11-02

## 2020-11-02 DIAGNOSIS — Z98.890 OTHER SPECIFIED POSTPROCEDURAL STATES: Chronic | ICD-10-CM

## 2020-12-09 ENCOUNTER — OUTPATIENT (OUTPATIENT)
Dept: OUTPATIENT SERVICES | Facility: HOSPITAL | Age: 60
LOS: 1 days | Discharge: HOME | End: 2020-12-09
Payer: MEDICAID

## 2020-12-09 DIAGNOSIS — R05 COUGH: ICD-10-CM

## 2020-12-09 DIAGNOSIS — R10.2 PELVIC AND PERINEAL PAIN: ICD-10-CM

## 2020-12-09 DIAGNOSIS — R10.9 UNSPECIFIED ABDOMINAL PAIN: ICD-10-CM

## 2020-12-09 DIAGNOSIS — Z98.890 OTHER SPECIFIED POSTPROCEDURAL STATES: Chronic | ICD-10-CM

## 2020-12-09 PROCEDURE — 71046 X-RAY EXAM CHEST 2 VIEWS: CPT | Mod: 26

## 2020-12-09 PROCEDURE — 76856 US EXAM PELVIC COMPLETE: CPT | Mod: 26

## 2020-12-09 PROCEDURE — 76700 US EXAM ABDOM COMPLETE: CPT | Mod: 26

## 2021-01-04 ENCOUNTER — APPOINTMENT (OUTPATIENT)
Dept: VASCULAR SURGERY | Facility: CLINIC | Age: 61
End: 2021-01-04
Payer: MEDICAID

## 2021-01-04 VITALS — TEMPERATURE: 97.7 F

## 2021-01-04 DIAGNOSIS — K55.1 CHRONIC VASCULAR DISORDERS OF INTESTINE: ICD-10-CM

## 2021-01-04 PROCEDURE — 99072 ADDL SUPL MATRL&STAF TM PHE: CPT

## 2021-01-04 PROCEDURE — 99213 OFFICE O/P EST LOW 20 MIN: CPT

## 2021-01-04 NOTE — ASSESSMENT
[FreeTextEntry1] : 58 y/o female with h/o smoking, SMA stenosis, mesenteric ischemia and underwent aorto-SMA PTFE bypass on 1/21/2020. Presents for followup, denies any post-prandial abdominal pain she has gained 8 lbs. \par \par \par Arterial duplex today showed patent aorto-SMA bypass graft.\par \par I have informed her of the test results and advised follow up in six months time. \par \par

## 2021-01-04 NOTE — HISTORY OF PRESENT ILLNESS
[FreeTextEntry1] : 58 y/o female with h/o smoking, SMA stenosis, mesenteric ischemia and underwent aorto-SMA PTFE bypass on 1/21/2020. Presents for followup, denies any post-prandial abdominal pain, c/o constipation intermittently. The patient is feeling better and has gained 8 lbs.

## 2021-01-21 ENCOUNTER — OUTPATIENT (OUTPATIENT)
Dept: OUTPATIENT SERVICES | Facility: HOSPITAL | Age: 61
LOS: 1 days | Discharge: HOME | End: 2021-01-21
Payer: MEDICAID

## 2021-01-21 DIAGNOSIS — Z98.890 OTHER SPECIFIED POSTPROCEDURAL STATES: Chronic | ICD-10-CM

## 2021-01-21 DIAGNOSIS — R91.8 OTHER NONSPECIFIC ABNORMAL FINDING OF LUNG FIELD: ICD-10-CM

## 2021-01-21 PROCEDURE — 71250 CT THORAX DX C-: CPT | Mod: 26

## 2021-05-06 ENCOUNTER — APPOINTMENT (OUTPATIENT)
Dept: VASCULAR SURGERY | Facility: CLINIC | Age: 61
End: 2021-05-06

## 2021-10-15 NOTE — PATIENT PROFILE ADULT - LAST BOWEL MOVEMENT DATE
EXAMINATION TYPE: XR lumbar spine 2 or 3V

 

DATE OF EXAM: 10/15/2021

 

COMPARISON: NONE

 

HISTORY: Fall. Pain

 

TECHNIQUE: 3 views

 

FINDINGS: Lumbar vertebra have normal alignment. Posterior elements are intact. There is no compressi
on fracture. Disc spaces are fairly normal. Sacroiliac joints are intact. There is minor degenerative
 spurring at L1-2. There is 15% wedging of T12 vertebral body. There is also slight depression superi
or endplate of T11.

 

IMPRESSION: Mild loss of height of T12 and T11 vertebra of uncertain age. No fracture seen of the lum
bar spine.
23-Nov-2019

## 2022-03-03 NOTE — ASU PREOP CHECKLIST - PATIENT PROBLEMS/NEEDS
[General Appearance - Well Developed] : well developed [Normal Appearance] : normal appearance [Well Groomed] : well groomed [General Appearance - Well Nourished] : well nourished Patient expressed no known problems or needs [No Deformities] : no deformities [General Appearance - In No Acute Distress] : no acute distress [Normal Conjunctiva] : the conjunctiva exhibited no abnormalities [Normal Oral Mucosa] : normal oral mucosa [Normal Jugular Venous A Waves Present] : normal jugular venous A waves present [Normal Jugular Venous V Waves Present] : normal jugular venous V waves present [No Jugular Venous Wilkes A Waves] : no jugular venous wilkes A waves [Respiration, Rhythm And Depth] : normal respiratory rhythm and effort [Exaggerated Use Of Accessory Muscles For Inspiration] : no accessory muscle use [Auscultation Breath Sounds / Voice Sounds] : lungs were clear to auscultation bilaterally [Bowel Sounds] : normal bowel sounds [Abdomen Soft] : soft [Abdomen Tenderness] : non-tender [Abnormal Walk] : normal gait [Gait - Sufficient For Exercise Testing] : the gait was sufficient for exercise testing [Nail Clubbing] : no clubbing of the fingernails [Cyanosis, Localized] : no localized cyanosis [Skin Color & Pigmentation] : normal skin color and pigmentation [Skin Turgor] : normal skin turgor [] : no rash [Oriented To Time, Place, And Person] : oriented to person, place, and time [Impaired Insight] : insight and judgment were intact [No Anxiety] : not feeling anxious [Normal Rate] : normal [Normal S1] : normal S1 [Normal S2] : normal S2 [No Murmur] : no murmurs heard [2+] : left 2+ [1+] : left 1+ [No Pitting Edema] : no pitting edema present [No Abnormalities] : the abdominal aorta was not enlarged and no bruit was heard [S3] : no S3 [S4] : no S4 [Right Carotid Bruit] : no bruit heard over the right carotid [Left Carotid Bruit] : no bruit heard over the left carotid [Right Femoral Bruit] : no bruit heard over the right femoral artery [Left Femoral Bruit] : no bruit heard over the left femoral artery

## 2023-04-18 NOTE — REVIEW OF SYSTEMS
Physical Therapy Visit    Visit Type: Daily Treatment Note  Visit: 2  Referring Provider: Hiro Molina MD  Medical Diagnosis (from order): Diagnosis Information    Diagnosis  719.46 (ICD-9-CM) - M25.561, M25.562 (ICD-10-CM) - Pain in both knees, unspecified chronicity         SUBJECTIVE                                                                                                               Patient reports pain today 6/10 in the R knee. Did do a lot of walking thorough sand during the weekend.       OBJECTIVE                                                                                                                                       Treatment     Therapeutic Exercise  Recumbent Bike Level 2 x 5 minutes   Hamstring and Hip flexor Stretch 3 x 20 Second Holds   Heel rasies x 20   Mini Squats x 20   Standing 3 way hip x 20   Sidestepping x 2 laps in gym   Sit to stand XxX, XxL, XxR x 10 each     Bridges + adduction isometric x 20 with 3 second holfds   Quad set with 3 second holds x 20   SLR x 20   Clams x 20             ASSESSMENT                                                                                                            Today's session focused heavily on hip and quad strength. Patient was able to tolerate all well without any major increase in pain. Required moderate verbal cuing and infrequent tactile cuing for execution of exercises with good form. States that she felt muscle soreness/fatigue in targeted muscle groups.  Updated HEP to add clams and Hamstring Stretch. Follow up and continue as seen fit        Therapy procedure time and total treatment time can be found documented on the Time Entry flowsheet     [Abdominal Pain] : abdominal pain

## 2024-03-08 NOTE — PATIENT PROFILE ADULT - FUNCTIONAL SCREEN CURRENT LEVEL: SWALLOWING (IF SCORE 2 OR MORE FOR ANY ITEM, CONSULT REHAB SERVICES), MLM)
cranial nerves II-XII intact/sensation intact 0 = swallows foods/liquids without difficulty details…